# Patient Record
Sex: FEMALE | Race: WHITE | Employment: OTHER | URBAN - METROPOLITAN AREA
[De-identification: names, ages, dates, MRNs, and addresses within clinical notes are randomized per-mention and may not be internally consistent; named-entity substitution may affect disease eponyms.]

---

## 2017-01-01 ENCOUNTER — MEDICAL CORRESPONDENCE (OUTPATIENT)
Dept: HEALTH INFORMATION MANAGEMENT | Facility: CLINIC | Age: 82
End: 2017-01-01

## 2017-01-01 ENCOUNTER — NURSING HOME VISIT (OUTPATIENT)
Dept: GERIATRICS | Facility: CLINIC | Age: 82
End: 2017-01-01
Payer: COMMERCIAL

## 2017-01-01 ENCOUNTER — NURSING HOME VISIT (OUTPATIENT)
Dept: GERIATRICS | Facility: CLINIC | Age: 82
End: 2017-01-01
Payer: MEDICARE

## 2017-01-01 ENCOUNTER — TRANSFERRED RECORDS (OUTPATIENT)
Dept: HEALTH INFORMATION MANAGEMENT | Facility: CLINIC | Age: 82
End: 2017-01-01

## 2017-01-01 VITALS
HEART RATE: 82 BPM | OXYGEN SATURATION: 97 % | RESPIRATION RATE: 20 BRPM | TEMPERATURE: 98.8 F | SYSTOLIC BLOOD PRESSURE: 121 MMHG | WEIGHT: 109.5 LBS | DIASTOLIC BLOOD PRESSURE: 66 MMHG | BODY MASS INDEX: 22.1 KG/M2

## 2017-01-01 VITALS
TEMPERATURE: 98.8 F | DIASTOLIC BLOOD PRESSURE: 58 MMHG | WEIGHT: 116 LBS | BODY MASS INDEX: 23.42 KG/M2 | HEART RATE: 93 BPM | RESPIRATION RATE: 20 BRPM | OXYGEN SATURATION: 94 % | SYSTOLIC BLOOD PRESSURE: 117 MMHG

## 2017-01-01 VITALS
OXYGEN SATURATION: 98 % | TEMPERATURE: 98.7 F | WEIGHT: 109.8 LBS | HEART RATE: 86 BPM | RESPIRATION RATE: 16 BRPM | SYSTOLIC BLOOD PRESSURE: 105 MMHG | BODY MASS INDEX: 22.16 KG/M2 | DIASTOLIC BLOOD PRESSURE: 69 MMHG

## 2017-01-01 VITALS
HEART RATE: 82 BPM | RESPIRATION RATE: 18 BRPM | BODY MASS INDEX: 22.79 KG/M2 | OXYGEN SATURATION: 95 % | SYSTOLIC BLOOD PRESSURE: 126 MMHG | TEMPERATURE: 98 F | WEIGHT: 112.9 LBS | DIASTOLIC BLOOD PRESSURE: 72 MMHG

## 2017-01-01 VITALS
BODY MASS INDEX: 22.23 KG/M2 | DIASTOLIC BLOOD PRESSURE: 63 MMHG | OXYGEN SATURATION: 94 % | RESPIRATION RATE: 20 BRPM | WEIGHT: 110.1 LBS | TEMPERATURE: 97.6 F | HEART RATE: 66 BPM | SYSTOLIC BLOOD PRESSURE: 106 MMHG

## 2017-01-01 VITALS
OXYGEN SATURATION: 94 % | TEMPERATURE: 98.8 F | HEART RATE: 93 BPM | WEIGHT: 111 LBS | DIASTOLIC BLOOD PRESSURE: 58 MMHG | SYSTOLIC BLOOD PRESSURE: 117 MMHG | BODY MASS INDEX: 22.41 KG/M2 | RESPIRATION RATE: 20 BRPM

## 2017-01-01 VITALS
SYSTOLIC BLOOD PRESSURE: 132 MMHG | WEIGHT: 109.9 LBS | DIASTOLIC BLOOD PRESSURE: 60 MMHG | BODY MASS INDEX: 22.19 KG/M2 | RESPIRATION RATE: 20 BRPM | HEART RATE: 76 BPM | TEMPERATURE: 97 F | OXYGEN SATURATION: 96 %

## 2017-01-01 VITALS
DIASTOLIC BLOOD PRESSURE: 67 MMHG | BODY MASS INDEX: 22.21 KG/M2 | OXYGEN SATURATION: 98 % | SYSTOLIC BLOOD PRESSURE: 118 MMHG | TEMPERATURE: 88 F | WEIGHT: 110 LBS | HEART RATE: 70 BPM | RESPIRATION RATE: 16 BRPM

## 2017-01-01 DIAGNOSIS — F02.818 LATE ONSET ALZHEIMER'S DISEASE WITH BEHAVIORAL DISTURBANCE (H): Primary | ICD-10-CM

## 2017-01-01 DIAGNOSIS — R40.0 SLEEPINESS: ICD-10-CM

## 2017-01-01 DIAGNOSIS — E03.9 ACQUIRED HYPOTHYROIDISM: ICD-10-CM

## 2017-01-01 DIAGNOSIS — G30.1 LATE ONSET ALZHEIMER'S DISEASE WITH BEHAVIORAL DISTURBANCE (H): ICD-10-CM

## 2017-01-01 DIAGNOSIS — F41.9 ANXIETY DISORDER, UNSPECIFIED TYPE: ICD-10-CM

## 2017-01-01 DIAGNOSIS — E03.9 ACQUIRED HYPOTHYROIDISM: Primary | ICD-10-CM

## 2017-01-01 DIAGNOSIS — F32.9 MAJOR DEPRESSIVE DISORDER WITH SINGLE EPISODE, REMISSION STATUS UNSPECIFIED: ICD-10-CM

## 2017-01-01 DIAGNOSIS — G30.1 LATE ONSET ALZHEIMER'S DISEASE WITH BEHAVIORAL DISTURBANCE (H): Primary | ICD-10-CM

## 2017-01-01 DIAGNOSIS — F02.818 LATE ONSET ALZHEIMER'S DISEASE WITH BEHAVIORAL DISTURBANCE (H): ICD-10-CM

## 2017-01-01 DIAGNOSIS — R45.1 AGITATION: ICD-10-CM

## 2017-01-01 DIAGNOSIS — R46.89 AGGRESSIVE BEHAVIOR: Primary | ICD-10-CM

## 2017-01-01 DIAGNOSIS — F41.9 ANXIETY DISORDER, UNSPECIFIED TYPE: Primary | ICD-10-CM

## 2017-01-01 DIAGNOSIS — R62.7 ADULT FAILURE TO THRIVE: Primary | ICD-10-CM

## 2017-01-01 LAB
DIFFERENTIAL: ABNORMAL
ERYTHROCYTE [DISTWIDTH] IN BLOOD BY AUTOMATED COUNT: 14.7 % (ref 11–14.5)
HCT VFR BLD AUTO: 47.9 % (ref 35–47)
HEMOGLOBIN: 14.9 G/DL (ref 12–16)
MCH RBC QN AUTO: 28.7 PG (ref 27–34)
MCHC RBC AUTO-ENTMCNC: 31.1 G/DL (ref 32–36)
MCV RBC AUTO: 92 FL (ref 80–100)
PLATELET # BLD AUTO: 198 THOU/UL (ref 140–440)
RBC # BLD AUTO: 5.19 MILL/UL (ref 3.8–5.4)
WBC # BLD AUTO: 6.4 THOU/UL (ref 4–11)

## 2017-01-01 PROCEDURE — 99309 SBSQ NF CARE MODERATE MDM 30: CPT | Performed by: NURSE PRACTITIONER

## 2017-01-01 PROCEDURE — 99308 SBSQ NF CARE LOW MDM 20: CPT | Performed by: NURSE PRACTITIONER

## 2017-01-01 PROCEDURE — 99309 SBSQ NF CARE MODERATE MDM 30: CPT | Mod: GW | Performed by: INTERNAL MEDICINE

## 2017-01-01 PROCEDURE — 99318 ZZC ANNUAL NURSING FAC ASSESSMNT, STABLE: CPT | Performed by: NURSE PRACTITIONER

## 2017-01-01 PROCEDURE — 99207 ZZC CDG-CORRECTLY CODED, REVIEWED AND AGREE: CPT | Performed by: INTERNAL MEDICINE

## 2017-01-01 PROCEDURE — 99308 SBSQ NF CARE LOW MDM 20: CPT | Performed by: INTERNAL MEDICINE

## 2017-01-01 RX ORDER — DIVALPROEX SODIUM 125 MG/1
250 CAPSULE, COATED PELLETS ORAL 4 TIMES DAILY
COMMUNITY
End: 2018-01-01

## 2017-01-01 RX ORDER — DIVALPROEX SODIUM 250 MG/1
250 TABLET, EXTENDED RELEASE ORAL 4 TIMES DAILY
COMMUNITY
End: 2017-01-01 | Stop reason: DRUGHIGH

## 2017-01-01 RX ORDER — BISACODYL 10 MG
10 SUPPOSITORY, RECTAL RECTAL
COMMUNITY

## 2017-01-01 RX ORDER — LORAZEPAM 0.5 MG/1
0.5 TABLET ORAL PRN
COMMUNITY
End: 2018-01-01

## 2017-01-03 ENCOUNTER — NURSING HOME VISIT (OUTPATIENT)
Dept: GERIATRICS | Facility: CLINIC | Age: 82
End: 2017-01-03
Payer: MEDICARE

## 2017-01-03 VITALS
WEIGHT: 151.2 LBS | HEART RATE: 84 BPM | BODY MASS INDEX: 30.52 KG/M2 | RESPIRATION RATE: 16 BRPM | OXYGEN SATURATION: 92 % | TEMPERATURE: 98 F | DIASTOLIC BLOOD PRESSURE: 70 MMHG | SYSTOLIC BLOOD PRESSURE: 124 MMHG

## 2017-01-03 DIAGNOSIS — F41.9 ANXIETY DISORDER, UNSPECIFIED TYPE: ICD-10-CM

## 2017-01-03 DIAGNOSIS — G30.1 LATE ONSET ALZHEIMER'S DISEASE WITH BEHAVIORAL DISTURBANCE (H): ICD-10-CM

## 2017-01-03 DIAGNOSIS — E03.9 HYPOTHYROIDISM, UNSPECIFIED TYPE: Primary | ICD-10-CM

## 2017-01-03 DIAGNOSIS — F02.818 LATE ONSET ALZHEIMER'S DISEASE WITH BEHAVIORAL DISTURBANCE (H): ICD-10-CM

## 2017-01-03 DIAGNOSIS — F32.9 MAJOR DEPRESSIVE DISORDER WITH SINGLE EPISODE, REMISSION STATUS UNSPECIFIED: ICD-10-CM

## 2017-01-03 PROCEDURE — 99310 SBSQ NF CARE HIGH MDM 45: CPT | Performed by: NURSE PRACTITIONER

## 2017-01-03 PROCEDURE — 99207 ZZC CDG-UP CODE -MED NECESSITY: CPT | Performed by: NURSE PRACTITIONER

## 2017-01-03 NOTE — PROGRESS NOTES
[place] [facility]    Chief Complaint   Patient presents with     custodial Regulatory       HPI:    Carlita Irizarry is a 89 year old  (1/2/1924), who is being seen today for a federally mandated E/M visit at [facility].     Today's concerns are:  Hypothyroidism, unspecified type  TSH   Date Value Ref Range Status   07/01/2012 1.22 0.4 - 5.0 mU/L Final   Currently taking levothyroxine 75 mcg PO qday. Asymptomatic.     Late onset Alzheimer's disease with behavioral disturbance  BIMS 3/15 on 11/23/16. No behaviors noted. Last fall 1/1/17. Taking Remeron and seroquel.     Major depressive disorder with single episode, remission status unspecified/Anxiety disorder, unspecified type  PHQ-9 00/27 on 11/23/16. Mood pleasant.     ALLERGIES: Review of patient's allergies indicates no known allergies.  PAST MEDICAL HISTORY:  has a past medical history of Thyroid disease; Alzheimer disease; and High cholesterol.  PAST SURGICAL HISTORY:  has past surgical history that includes Irrigation and debridement upper extremity, combined (6/30/2012).  FAMILY HISTORY: family history is not on file.  SOCIAL HISTORY:  reports that she has quit smoking. She does not have any smokeless tobacco history on file. She reports that she drinks alcohol. She reports that she does not use illicit drugs.    MEDICATIONS:  Current Outpatient Prescriptions   Medication Sig Dispense Refill     Acetaminophen (TYLENOL ARTHRITIS PAIN PO) Take 650 mg by mouth 3 times daily Also give 650mg po qday prn       Dextromethorphan-guaiFENesin  MG/5ML syrup Take 10 mLs by mouth every 6 hours as needed for cough       loperamide (IMODIUM) 2 MG capsule Take 2 mg by mouth 4 times daily as needed for diarrhea       Acetaminophen (TYLENOL PO) Take 650 mg by mouth every 4 hours as needed for mild pain or fever       ASPIRIN PO Take 81 mg by mouth daily       bisacodyl (DULCOLAX) 10 MG suppository Place 10 mg rectally daily as needed for  constipation       calcium carb 1250 mg, 500 mg [place],/vitamin D 200 units (OSCAL WITH D) 500-200 MG-UNIT per tablet Take 1 tablet by mouth 2 times daily (with meals)       sodium phosphate (FLEET ENEMA) 7-19 GM/118ML rectal enema Place 1 enema rectally daily as needed for constipation       Mirtazapine (REMERON PO) Take 15 mg by mouth daily       SUNSCREENS EX Externally apply topically every 12 hours as needed Apply to skin topically every 12 hours as needed for sun exposure       magnesium hydroxide (MILK OF MAGNESIA) 400 MG/5ML suspension Take 30 mLs by mouth daily as needed for constipation or heartburn       alum & mag hydroxide-simethicone (MYLANTA/MAALOX) 200-200-20 MG/5ML SUSP Take 30 mLs by mouth 4 times daily as needed for indigestion       QUEtiapine Fumarate (SEROQUEL PO) Take as directed       LEVOTHYROXINE SODIUM PO Take 75 mcg by mouth daily        DONEPEZIL HCL PO Take 10 mg by mouth every evening.       Medications reconciled to facility chart and changes were made to reflect current medications as identified as above med list. Below are the changes that were made:   Medications stopped since last EPIC medication reconciliation:   Medications Discontinued During This Encounter   Medication Reason     oxyCODONE (ROXICODONE) 5 MG immediate release tablet Medication Reconciliation Clean Up     Case Management:  I have reviewed the care plan and MDS and do agree with the plan. Patient's desire to return to the community is not present.  Information reviewed:  Medications, vital signs, orders, and nursing notes.    ROS:  4 point ROS including Respiratory, CV, GI and , other than that noted in the HPI,  is negative    Exam:  /70 mmHg  Pulse 84  Temp(Src) 98  F (36.7  C)  Resp 16  Wt 151 lb 3.2 oz (68.584 kg)  SpO2 92%  GENERAL APPEARANCE:  Alert, in no distress  ENT:  Mouth and posterior oropharynx normal, moist mucous membranes  RESP:  respiratory effort and palpation of chest normal, lungs  clear to auscultation   CV:  Palpation and auscultation of heart done , regular rate and rhythm, no murmur, rub, or gallop  M/S:   active movement of bilateral upper and lower extremities.   SKIN:  Inspection of skin and subcutaneous tissue baseline, Palpation of skin and subcutaneous tissue baseline  NEURO:   Cranial nerves 2-12 are normal tested and grossly at patient's baseline  PSYCH:  memory impaired , affect and mood normal    Lab/Diagnostic data:      CBC RESULTS:   Recent Labs   Lab Test  04/19/15   1510  07/06/13   1326   WBC  6.5  6.3   RBC  4.56  4.70   HGB  13.2  14.6   HCT  40.3  43.8   MCV  88  93   MCH  28.9  31.1   MCHC  32.8  33.3   RDW  13.9  13.4   PLT  187  239       Last Basic Metabolic Panel:  Recent Labs   Lab Test  04/19/15   1510  07/06/13   1326   NA  140  140   POTASSIUM  3.7  3.8   CHLORIDE  107  104   OZ  8.0*  9.0   CO2  27  26   BUN  20  18   CR  0.82  0.83   GLC  110*  163*       Liver Function Studies -   Recent Labs   Lab Test  07/06/13   1326   PROTTOTAL  6.6*   ALBUMIN  3.9   BILITOTAL  0.6   ALKPHOS  68   AST  31   ALT  33       TSH   Date Value Ref Range Status   07/01/2012 1.22 0.4 - 5.0 mU/L Final         ASSESSMENT/PLAN  (E03.9) Hypothyroidism, unspecified type  (primary encounter diagnosis)  On replacement. TSH next lab day. Continue Synthroid as ordered.     (G30.1,  F02.81) Late onset Alzheimer's disease with behavioral disturbance  Chronic, progressive. Needs 24 hour skilled nursing care. HPI/ROS difficult to obtain with cognitive impairment. Expect further functional and cognitive decline. Expect weight loss. Continue 24 hour care. Monitor weight. Monitor functional status. Monitor for behavioral disturbances.    (F32.9) Major depressive disorder with single episode, remission status unspecifie  (F41.9) Anxiety disorder, unspecified type  Continue Remeron and scheduled and prn acetaminophen.     Orders:  1.) TSH, BMP, CBC, vitamin B12 next lab day    Total time spent  with patient visit was 35 min including patient visit and review of past records.Greater than 50% of total time spent with counseling and coordinating care.    Electronically signed by:  MICHAEL Porras CNP

## 2017-01-09 ENCOUNTER — TELEPHONE (OUTPATIENT)
Dept: GERIATRICS | Facility: CLINIC | Age: 82
End: 2017-01-09

## 2017-01-09 ENCOUNTER — TRANSFERRED RECORDS (OUTPATIENT)
Dept: HEALTH INFORMATION MANAGEMENT | Facility: CLINIC | Age: 82
End: 2017-01-09

## 2017-01-09 DIAGNOSIS — R11.2 NON-INTRACTABLE VOMITING WITH NAUSEA, UNSPECIFIED VOMITING TYPE: Primary | ICD-10-CM

## 2017-01-09 LAB
ANION GAP SERPL CALCULATED.3IONS-SCNC: 9 MMOL/L (ref 5–18)
BUN SERPL-MCNC: 24 MG/DL (ref 8–28)
CALCIUM SERPL-MCNC: 8.8 MG/DL (ref 8.5–10.5)
CHLORIDE SERPLBLD-SCNC: 106 MMOL/L (ref 98–107)
CO2 SERPL-SCNC: 26 MMOL/L (ref 22–31)
CREAT SERPL-MCNC: 0.74 MG/DL (ref 0.6–1.1)
ERYTHROCYTE [DISTWIDTH] IN BLOOD BY AUTOMATED COUNT: 14.1 % (ref 11–14.5)
GFR SERPL CREATININE-BSD FRML MDRD: >60 ML/MIN/1.73M2
GLUCOSE SERPL-MCNC: 95 MG/DL (ref 70–125)
HCT VFR BLD AUTO: 45.3 % (ref 35–47)
HEMOGLOBIN: 13.9 G/DL (ref 12–16)
MCH RBC QN AUTO: 28.3 PG (ref 27–34)
MCHC RBC AUTO-ENTMCNC: 30.7 G/DL (ref 32–36)
MCV RBC AUTO: 92 FL (ref 80–100)
PLATELET # BLD AUTO: 261 THOU/UL (ref 140–440)
PMV BLD: 10.3 FL (ref 8.5–12.5)
POTASSIUM SERPL-SCNC: 4.5 MMOL/L (ref 3.5–5)
RBC # BLD AUTO: 4.92 MILL/UL (ref 3.8–?)
SODIUM SERPL-SCNC: 141 MMOL/L (ref 136–145)
WBC # BLD AUTO: 5.6 THOU/UL (ref 4–11)

## 2017-01-09 RX ORDER — ACETAMINOPHEN 500 MG
1000 TABLET ORAL 3 TIMES DAILY
Qty: 100 TABLET | Refills: 0
Start: 2017-01-09 | End: 2017-04-25

## 2017-01-10 ENCOUNTER — TRANSFERRED RECORDS (OUTPATIENT)
Dept: HEALTH INFORMATION MANAGEMENT | Facility: CLINIC | Age: 82
End: 2017-01-10

## 2017-01-10 LAB
ANION GAP SERPL CALCULATED.3IONS-SCNC: 11 MMOL/L (ref 5–18)
BUN SERPL-MCNC: 23 MG/DL (ref 8–28)
CALCIUM SERPL-MCNC: 8.4 MG/DL (ref 8.5–10.5)
CHLORIDE SERPLBLD-SCNC: 107 MMOL/L (ref 98–107)
CO2 SERPL-SCNC: 21 MMOL/L (ref 22–31)
CREAT SERPL-MCNC: 0.67 MG/DL (ref 0.6–1.1)
DIFFERENTIAL: ABNORMAL
ERYTHROCYTE [DISTWIDTH] IN BLOOD BY AUTOMATED COUNT: 14.3 % (ref 11–14.5)
GFR SERPL CREATININE-BSD FRML MDRD: >60 ML/MIN/1.73M2
GLUCOSE SERPL-MCNC: 82 MG/DL (ref 70–125)
HCT VFR BLD AUTO: 44.1 % (ref 35–47)
HEMOGLOBIN: 13.6 G/DL (ref 12–16)
MCH RBC QN AUTO: 28.6 PG (ref 27–34)
MCHC RBC AUTO-ENTMCNC: 30.8 G/DL (ref 32–36)
MCV RBC AUTO: 93 FL (ref 80–100)
PLATELET # BLD AUTO: 219 THOU/UL (ref 140–440)
POTASSIUM SERPL-SCNC: 4 MMOL/L (ref 3.5–5)
RBC # BLD AUTO: 4.76 MILL/UL (ref 3.8–5.4)
SODIUM SERPL-SCNC: 139 MMOL/L (ref 136–145)
TSH SERPL-ACNC: 3.6 ULU/ML (ref 0.3–5)
WBC # BLD AUTO: 4.8 THOU/UL (ref 4–11)

## 2017-01-10 NOTE — TELEPHONE ENCOUNTER
Writer notified that resident has had 2 emesis over previous 48 hours and temperature ranging from 99.6-100.7. Has tylenol 650 mg PO qday. Denies abdominal pain and changes in bowels. No emesis on day shift today; however, resident having poor PO intake.     Plan:  CBC and BMP: within normal limits 1/9/17  Tylenol 1000 mg PO TID  Encourage po intake and notify NP with changes.

## 2017-01-13 ENCOUNTER — NURSING HOME VISIT (OUTPATIENT)
Dept: GERIATRICS | Facility: CLINIC | Age: 82
End: 2017-01-13
Payer: MEDICARE

## 2017-01-13 VITALS
OXYGEN SATURATION: 93 % | DIASTOLIC BLOOD PRESSURE: 62 MMHG | WEIGHT: 110.9 LBS | RESPIRATION RATE: 16 BRPM | BODY MASS INDEX: 22.39 KG/M2 | HEART RATE: 58 BPM | SYSTOLIC BLOOD PRESSURE: 121 MMHG | TEMPERATURE: 99.3 F

## 2017-01-13 DIAGNOSIS — G30.1 LATE ONSET ALZHEIMER'S DISEASE WITH BEHAVIORAL DISTURBANCE (H): Primary | ICD-10-CM

## 2017-01-13 DIAGNOSIS — F02.818 LATE ONSET ALZHEIMER'S DISEASE WITH BEHAVIORAL DISTURBANCE (H): Primary | ICD-10-CM

## 2017-01-13 PROCEDURE — 99207 ZZC CDG-CORRECTLY CODED, REVIEWED AND AGREE: CPT | Performed by: NURSE PRACTITIONER

## 2017-01-13 PROCEDURE — 99308 SBSQ NF CARE LOW MDM 20: CPT | Performed by: NURSE PRACTITIONER

## 2017-01-13 NOTE — PROGRESS NOTES
[place] [facility]    Chief Complaint   Patient presents with     RECHECK     Behaviors       HPI:    Carlita Irizarry is a 89 year old  (1/2/1924), who is being seen today for an episodic care visit at [facility].       Today's concern is:  Late onset Alzheimer's disease with behavioral disturbance  Last BIMS 3/15 on 11/23/16. No behaviors noted by staff.   Taking Seroquel :  -0800 100mg  -1200 25 mg  -1600 50 mg  -2000 50 mg      REVIEW OF SYSTEMS:  Unobtainable secondary to cognitive impairment or aphasia.    /62 mmHg  Pulse 58  Temp(Src) 99.3  F (37.4  C)  Resp 16  Wt 110 lb 14.4 oz (50.304 kg)  SpO2 93%      GENERAL APPEARANCE:  Alert, in no distress  Psych: Memory impairment.    Assessment:  (G30.1,  F02.81) Late onset Alzheimer's disease with behavioral disturbance  (primary encounter diagnosis)  No behaviors noted on current doses of Seroquel Remeron    Plan:  1.) Decrease morning dose of Seroquel to 75 mg PO   2.) Resides on secure dementia unit with assistance for ADLs and meals.  3.) Continue to monitor behavior and mood and make adjustments as clinically indicated.     Time 15 minutes    MICHAEL Porras CNP

## 2017-01-15 RX ORDER — QUETIAPINE FUMARATE 25 MG/1
75 TABLET, FILM COATED ORAL EVERY MORNING
Qty: 60 TABLET
Start: 2017-01-15 | End: 2017-05-24

## 2017-01-15 RX ORDER — QUETIAPINE FUMARATE 25 MG/1
25 TABLET, FILM COATED ORAL DAILY
Start: 2017-01-15 | End: 2017-05-24 | Stop reason: DRUGHIGH

## 2017-03-15 VITALS
WEIGHT: 108.2 LBS | RESPIRATION RATE: 18 BRPM | OXYGEN SATURATION: 93 % | TEMPERATURE: 96.8 F | DIASTOLIC BLOOD PRESSURE: 70 MMHG | BODY MASS INDEX: 21.84 KG/M2 | HEART RATE: 92 BPM | SYSTOLIC BLOOD PRESSURE: 110 MMHG

## 2017-03-16 ENCOUNTER — NURSING HOME VISIT (OUTPATIENT)
Dept: GERIATRICS | Facility: CLINIC | Age: 82
End: 2017-03-16
Payer: COMMERCIAL

## 2017-03-16 DIAGNOSIS — F02.818 LATE ONSET ALZHEIMER'S DISEASE WITH BEHAVIORAL DISTURBANCE (H): ICD-10-CM

## 2017-03-16 DIAGNOSIS — F41.9 ANXIETY DISORDER, UNSPECIFIED TYPE: ICD-10-CM

## 2017-03-16 DIAGNOSIS — F32.9 MAJOR DEPRESSIVE DISORDER WITH SINGLE EPISODE, REMISSION STATUS UNSPECIFIED: ICD-10-CM

## 2017-03-16 DIAGNOSIS — E03.9 ACQUIRED HYPOTHYROIDISM: Primary | ICD-10-CM

## 2017-03-16 DIAGNOSIS — G30.1 LATE ONSET ALZHEIMER'S DISEASE WITH BEHAVIORAL DISTURBANCE (H): ICD-10-CM

## 2017-03-16 PROCEDURE — 99308 SBSQ NF CARE LOW MDM 20: CPT | Performed by: INTERNAL MEDICINE

## 2017-03-16 PROCEDURE — 99207 ZZC CDG-CORRECTLY CODED, REVIEWED AND AGREE: CPT | Performed by: INTERNAL MEDICINE

## 2017-03-23 PROBLEM — E03.9 ACQUIRED HYPOTHYROIDISM: Status: ACTIVE | Noted: 2017-03-23

## 2017-03-23 NOTE — PROGRESS NOTES
Carlita Irizarry is a 89 year old female seen March 15, 2017 at [facility] where she has resided for 7 months (admit 8/2016) seen to follow up dementia with behavioral disturbance and frequent falls.   She is seen on the unit, up to WC.  Quiet and a little suspicious, limited history.           By chart review she has progressive dementia, on donepezil for the past 5 years.   She has had recurrent falls, and was seen in ER for confusion, disorientation and hallucinations 3 years ago  She has required quetiapine for psychotic features, very gradual dose reductions going okay so far.    .    She is also treated for hypothyroidism and anxiety.      PMH:  Alzheimer's dementia, dx 2011  Anxiety / depression  Hypothyroidism  S/p open left elbow fx, 2012    SH:  , previously lived in a Senior Apartment.   Has one son Fredis, who is POA.     ROS:  Limited, but negative except for above.    Weight is stable.      EXAM:  NAD  /70  Pulse 92  Temp 96.8  F (36  C)  Resp 18  Wt 108 lb 3.2 oz (49.1 kg)  SpO2 93%  BMI 21.84 kg/m2   Neck supple without adenopathy  Lungs clear bilaterally with fair air movement  Heart RRR s1s2, without murmur  Abd soft, NT, no distention, +BS  Ext without edema  Neuro: no focal findings, no tremor or stiffness, limited history  Psych: affect okay, suspicious    TSH   Date Value Ref Range Status   01/10/2017 3.60 0.30 - 5.00 ulU/mL Final     Lab Results   Component Value Date    WBC 4.8 01/10/2017     Lab Results   Component Value Date    RBC 4.76 01/10/2017     Lab Results   Component Value Date    HGB 13.6 01/10/2017     Lab Results   Component Value Date    MCV 93 01/10/2017     Lab Results   Component Value Date     01/10/2017      Last Basic Metabolic Panel:  Lab Results   Component Value Date     01/10/2017      Lab Results   Component Value Date    POTASSIUM 4.0 01/10/2017     Lab Results   Component Value Date    CHLORIDE 107 01/10/2017     Lab  Results   Component Value Date    OZ 8.4 01/10/2017     Lab Results   Component Value Date    CO2 21 01/10/2017     Lab Results   Component Value Date    BUN 23 01/10/2017     Lab Results   Component Value Date    CR 0.67 01/10/2017     Lab Results   Component Value Date    GLC 82 01/10/2017         IMP/PLAN:  (E03.9) Hypothyroidism, unspecified type    Comment: on replacement, at goal TSH  Plan: yearly recheck    (G30.1,  F02.81) Late onset Alzheimer's disease with behavioral disturbance  Comment: with suspiciousness, loss of language, orientation  Plan: Board and Care Memory Care unit for assist with meds, meals, activity, safety.   Continue GDR of quetiapine as tolerated.       (F41.9) Anxiety disorder, unspecified type  Comment: and depression  Plan: continue Angela Fitch MD

## 2017-04-25 ENCOUNTER — NURSING HOME VISIT (OUTPATIENT)
Dept: GERIATRICS | Facility: CLINIC | Age: 82
End: 2017-04-25
Payer: COMMERCIAL

## 2017-04-25 VITALS
HEART RATE: 84 BPM | SYSTOLIC BLOOD PRESSURE: 132 MMHG | RESPIRATION RATE: 20 BRPM | WEIGHT: 111.7 LBS | TEMPERATURE: 99.1 F | DIASTOLIC BLOOD PRESSURE: 76 MMHG | OXYGEN SATURATION: 94 % | BODY MASS INDEX: 22.55 KG/M2

## 2017-04-25 DIAGNOSIS — F32.9 MAJOR DEPRESSIVE DISORDER WITH SINGLE EPISODE, REMISSION STATUS UNSPECIFIED: ICD-10-CM

## 2017-04-25 DIAGNOSIS — F41.9 ANXIETY DISORDER, UNSPECIFIED TYPE: ICD-10-CM

## 2017-04-25 DIAGNOSIS — F02.818 LATE ONSET ALZHEIMER'S DISEASE WITH BEHAVIORAL DISTURBANCE (H): Primary | ICD-10-CM

## 2017-04-25 DIAGNOSIS — E03.9 ACQUIRED HYPOTHYROIDISM: ICD-10-CM

## 2017-04-25 DIAGNOSIS — G30.1 LATE ONSET ALZHEIMER'S DISEASE WITH BEHAVIORAL DISTURBANCE (H): Primary | ICD-10-CM

## 2017-04-25 PROCEDURE — 99309 SBSQ NF CARE MODERATE MDM 30: CPT | Performed by: NURSE PRACTITIONER

## 2017-04-25 PROCEDURE — 99207 ZZC CDG-CORRECTLY CODED, REVIEWED AND AGREE: CPT | Performed by: NURSE PRACTITIONER

## 2017-04-25 RX ORDER — BACITRACIN ZINC 500 [USP'U]/G
OINTMENT TOPICAL
COMMUNITY
Start: 2017-04-24 | End: 2017-04-29

## 2017-04-25 NOTE — PROGRESS NOTES
[place] [facility]    Chief Complaint   Patient presents with     RECHECK       HPI:    Carlita Irizarry is a 89 year old  (1/2/1924), who is being seen today for an episodic care visit at [facility].     Today's concern is:  Late onset Alzheimer's disease with behavioral disturbance/Major depressive disorder with single episode, remission status unspecified/Anxiety disorder, unspecified type  Patient very pleasant today with minimal noted behaviors since transfer to unit. She is seen today sitting in dining room at a table conversating with another resident. The patient is very thankful for my visit and for caring for her while she resides in this unit.  She is on a regimen of Seroquel 75mg qAM, 50mg at noon, 1600 and HS; She is also on Donpezil and Remeron. Patient's son received a non-coverage letter for Seroquel on 4/5 - patient to receive a 93-day supply then medication will not be covered.     Acquired hypothyroidism  Lab Results   Component Value Date    TSH 3.60 01/10/2017   Patient on regimen of Synthroid 75mcg qDay       ALLERGIES: Review of patient's allergies indicates no known allergies.  Past Medical, Surgical, Family and Social History reviewed and updated in [facility].    Current Outpatient Prescriptions   Medication Sig Dispense Refill     bacitracin ointment two times a day for scratch on face for 5 Days       QUEtiapine (SEROQUEL) 25 MG tablet Take 3 tablets (75 mg) by mouth every morning Take as directed 60 tablet      QUEtiapine (SEROQUEL) 25 MG tablet Take 1 tablet (25 mg) by mouth daily Take at noon.       QUEtiapine Fumarate (SEROQUEL PO) Take 50 mg by mouth daily 1600       QUEtiapine Fumarate (SEROQUEL PO) Take 50 mg by mouth At Bedtime       ASPIRIN PO Take 81 mg by mouth daily       calcium carb 1250 mg, 500 mg [place],/vitamin D 200 units (OSCAL WITH D) 500-200 MG-UNIT per tablet Take 1 tablet by mouth 2 times daily (with meals)       Mirtazapine (REMERON PO) Take 15  mg by mouth daily       LEVOTHYROXINE SODIUM PO Take 75 mcg by mouth daily        DONEPEZIL HCL PO Take 10 mg by mouth every evening.       Medications reconciled to facility chart and changes were made to reflect current medications as identified as above med list. Below are the changes that were made:   Medications stopped since last EPIC medication reconciliation:   Medications Discontinued During This Encounter   Medication Reason     Acetaminophen (TYLENOL PO) Medication Reconciliation Clean Up     acetaminophen (TYLENOL) 500 MG tablet Medication Reconciliation Clean Up       Medications started since last [facility] medication reconciliation:  Orders Placed This Encounter   Medications     bacitracin ointment     Sig: two times a day for scratch on face for 5 Days     REVIEW OF SYSTEMS:  4 point ROS including Respiratory, CV, GI and , other than that noted in the HPI,  is negative    Physical Exam:  /76  Pulse 84  Temp 99.1  F (37.3  C)  Resp 20  Wt 111 lb 11.2 oz (50.7 kg)  SpO2 94%  BMI 22.55 kg/m2  GENERAL APPEARANCE:  Alert, in no distress, appears healthy, cooperative, disoriented, pleasant, elderly female sitting at table in dining room  ENT:  Mouth and posterior oropharynx normal, moist mucous membranes, normal hearing acuity  EYES:  EOM, conjunctivae, lids, pupils and irises normal  NECK:  No adenopathy,masses or thyromegaly  RESP:  respiratory effort and palpation of chest normal, lungs clear to auscultation , no respiratory distress  CV:  Palpation and auscultation of heart done , regular rate and rhythm, no murmur, rub, or gallop, no edema, +2 pedal pulses  ABDOMEN:  normal bowel sounds, soft, nontender, no hepatosplenomegaly or other masses  M/S:   Gait and station abnormal - EVELIO 2/2 patient sitting in chair  Digits and nails abnormal - arthritic changes present  PSYCH:  oriented to self only, insight and judgement impaired, memory impaired , affect and mood normal, thought content -  disorganized    Recent Labs:      CBC RESULTS:   Recent Labs   Lab Test 01/10/17 01/09/17   WBC  4.8  5.6   RBC  4.76  4.92   HGB  13.6  13.9   HCT  44.1  45.3   MCV  93  92   MCH  28.6  28.3   MCHC  30.8*  30.7*   RDW  14.3  14.1   PLT  219  261       Last Basic Metabolic Panel:  Recent Labs   Lab Test 01/10/17 01/09/17   NA  139  141   POTASSIUM  4.0  4.5   CHLORIDE  107  106   OZ  8.4*  8.8   CO2  21*  26   BUN  23  24   CR  0.67  0.74   GLC  82  95       Liver Function Studies -   Recent Labs   Lab Test  07/06/13   1326   PROTTOTAL  6.6*   ALBUMIN  3.9   BILITOTAL  0.6   ALKPHOS  68   AST  31   ALT  33       TSH   Date Value Ref Range Status   01/10/2017 3.60 0.30 - 5.00 ulU/mL Final   07/01/2012 1.22 0.4 - 5.0 mU/L Final               Assessment/Plan:  Late onset Alzheimer's disease with behavioral disturbance/Major depressive disorder with single episode, remission status unspecified/Anxiety disorder, unspecified type  Will continue medications as currently ordered and allow patient to settle in to new unit - f/u in 2 weeks for further assessment of any noted behaviors and plan to taper down Seroquel to least necessary dosing for potential coverage by insurance    Acquired hypothyroidism  Stable on current regimen; continue medications as currently ordered.    Electronically signed by  DEVON Huang  [facility]

## 2017-05-08 NOTE — PROGRESS NOTES
[place] [facility]    Chief Complaint   Patient presents with     halfway Regulatory       HPI:    Carlita Irizarry is a 89 year old  (1/2/1924), who is being seen today for a federally mandated E/M visit at [facility].     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         ALLERGIES: Review of patient's allergies indicates no known allergies.  PAST MEDICAL HISTORY:  has a past medical history of Alzheimer disease; High cholesterol; and Thyroid disease.  PAST SURGICAL HISTORY:  has a past surgical history that includes Irrigation and debridement upper extremity, combined (6/30/2012).  FAMILY HISTORY: family history is not on file.  SOCIAL HISTORY:  reports that she has quit smoking. She does not have any smokeless tobacco history on file. She reports that she drinks alcohol. She reports that she does not use illicit drugs.    MEDICATIONS:  Current Outpatient Prescriptions   Medication Sig Dispense Refill     QUEtiapine (SEROQUEL) 25 MG tablet Take 3 tablets (75 mg) by mouth every morning Take as directed 60 tablet      QUEtiapine (SEROQUEL) 25 MG tablet Take 1  tablet (25 mg) by mouth daily Take at noon.       QUEtiapine Fumarate (SEROQUEL PO) Take 50 mg by mouth daily 1600       QUEtiapine Fumarate (SEROQUEL PO) Take 50 mg by mouth At Bedtime       ASPIRIN PO Take 81 mg by mouth daily       calcium carb 1250 mg, 500 mg [place],/vitamin D 200 units (OSCAL WITH D) 500-200 MG-UNIT per tablet Take 1 tablet by mouth 2 times daily (with meals)       Mirtazapine (REMERON PO) Take 15 mg by mouth daily       LEVOTHYROXINE SODIUM PO Take 75 mcg by mouth daily        DONEPEZIL HCL PO Take 10 mg by mouth every evening.         Case Management:  I have reviewed the care plan and MDS and do agree with the plan. Patient's desire to return to the community is not assessible due to cognitive impairment.  Information reviewed:  Medications, vital signs, orders, and nursing notes.    ROS:  4 point ROS including Respiratory, CV, GI and , other than that noted in the HPI,  is negative    Exam:  /76  Pulse 84  Temp 99.1  F (37.3  C)  Resp 20  Wt 111 lb 11.2 oz (50.7 kg)  SpO2 94%  BMI 22.55 kg/m2  GENERAL APPEARANCE:  Alert, in no distress, appears healthy, cooperative, disoriented, pleasant, elderly female sitting at table in dining room  ENT:  Mouth and posterior oropharynx normal, moist mucous membranes, normal hearing acuity  EYES:  EOM, conjunctivae, lids, pupils and irises normal  NECK:  No adenopathy,masses or thyromegaly  RESP: Respiratory effort and palpation of chest normal, lungs clear to auscultation , no respiratory distress  CV:  Palpation and auscultation of heart done , regular rate and rhythm, no murmur, rub, or gallop, no edema, +2 pedal pulses  ABDOMEN:  normal bowel sounds, soft, nontender, no hepatosplenomegaly or other masses  M/S:   Gait and station abnormal - w/c main means of mobility; Digits and nails abnormal - arthritic changes present  PSYCH:  oriented to self only, insight and judgement impaired, memory impaired, affect and mood normal, thought content  - disorganized    Lab/Diagnostic data:      CBC RESULTS:   Recent Labs   Lab Test 01/10/17 01/09/17   WBC  4.8  5.6   RBC  4.76  4.92   HGB  13.6  13.9   HCT  44.1  45.3   MCV  93  92   MCH  28.6  28.3   MCHC  30.8*  30.7*   RDW  14.3  14.1   PLT  219  261       Last Basic Metabolic Panel:  Recent Labs   Lab Test 01/10/17 01/09/17   NA  139  141   POTASSIUM  4.0  4.5   CHLORIDE  107  106   OZ  8.4*  8.8   CO2  21*  26   BUN  23  24   CR  0.67  0.74   GLC  82  95       Liver Function Studies -   Recent Labs   Lab Test  07/06/13   1326   PROTTOTAL  6.6*   ALBUMIN  3.9   BILITOTAL  0.6   ALKPHOS  68   AST  31   ALT  33       TSH   Date Value Ref Range Status   01/10/2017 3.60 0.30 - 5.00 ulU/mL Final   07/01/2012 1.22 0.4 - 5.0 mU/L Final             ASSESSMENT/PLAN  Major depressive disorder with single episode, remission status unspecified  Stable on current regimen; continue medications as currently ordered.    Late onset Alzheimer's disease with behavioral disturbance  Stable on current regimen; continue medications as currently ordered.    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    Total time spent with patient visit was 35 min including patient visit and review of past records.Greater than 50% of total time spent with counseling and coordinating care.    Electronically signed by:  DEVON Huang  [facility]

## 2017-05-09 ENCOUNTER — NURSING HOME VISIT (OUTPATIENT)
Dept: GERIATRICS | Facility: CLINIC | Age: 82
End: 2017-05-09
Payer: COMMERCIAL

## 2017-05-09 VITALS
RESPIRATION RATE: 20 BRPM | TEMPERATURE: 99.1 F | WEIGHT: 111.7 LBS | OXYGEN SATURATION: 94 % | DIASTOLIC BLOOD PRESSURE: 76 MMHG | SYSTOLIC BLOOD PRESSURE: 132 MMHG | HEART RATE: 84 BPM | BODY MASS INDEX: 22.55 KG/M2

## 2017-05-09 DIAGNOSIS — F41.9 ANXIETY DISORDER, UNSPECIFIED TYPE: ICD-10-CM

## 2017-05-09 DIAGNOSIS — F32.9 MAJOR DEPRESSIVE DISORDER WITH SINGLE EPISODE, REMISSION STATUS UNSPECIFIED: Primary | ICD-10-CM

## 2017-05-09 DIAGNOSIS — E03.9 ACQUIRED HYPOTHYROIDISM: ICD-10-CM

## 2017-05-09 DIAGNOSIS — F29 UNSPECIFIED PSYCHOSIS NOT DUE TO A SUBSTANCE OR KNOWN PHYSIOLOGICAL CONDITION (H): ICD-10-CM

## 2017-05-09 DIAGNOSIS — F02.818 LATE ONSET ALZHEIMER'S DISEASE WITH BEHAVIORAL DISTURBANCE (H): ICD-10-CM

## 2017-05-09 DIAGNOSIS — G30.1 LATE ONSET ALZHEIMER'S DISEASE WITH BEHAVIORAL DISTURBANCE (H): ICD-10-CM

## 2017-05-09 PROCEDURE — 99310 SBSQ NF CARE HIGH MDM 45: CPT | Performed by: NURSE PRACTITIONER

## 2017-05-09 PROCEDURE — 99207 ZZC CDG-CORRECTLY CODED, REVIEWED AND AGREE: CPT | Performed by: NURSE PRACTITIONER

## 2017-05-11 ENCOUNTER — TRANSFERRED RECORDS (OUTPATIENT)
Dept: HEALTH INFORMATION MANAGEMENT | Facility: CLINIC | Age: 82
End: 2017-05-11

## 2017-05-11 LAB
ALT SERPL-CCNC: 14 U/L (ref 0–45)
AST SERPL-CCNC: 14 U/L (ref 0–40)

## 2017-05-17 ENCOUNTER — NURSING HOME VISIT (OUTPATIENT)
Dept: GERIATRICS | Facility: CLINIC | Age: 82
End: 2017-05-17
Payer: COMMERCIAL

## 2017-05-17 DIAGNOSIS — G30.1 LATE ONSET ALZHEIMER'S DISEASE WITH BEHAVIORAL DISTURBANCE (H): Primary | ICD-10-CM

## 2017-05-17 DIAGNOSIS — R63.0 DECREASED APPETITE: ICD-10-CM

## 2017-05-17 DIAGNOSIS — F02.818 LATE ONSET ALZHEIMER'S DISEASE WITH BEHAVIORAL DISTURBANCE (H): Primary | ICD-10-CM

## 2017-05-17 PROCEDURE — 99308 SBSQ NF CARE LOW MDM 20: CPT | Performed by: NURSE PRACTITIONER

## 2017-05-17 PROCEDURE — 99207 ZZC CDG-CORRECTLY CODED, REVIEWED AND AGREE: CPT | Performed by: NURSE PRACTITIONER

## 2017-05-17 NOTE — PROGRESS NOTES
[place] [facility]    Chief Complaint   Patient presents with     RECHECK     HPI:    Carlita Irizarry is a 89 year old  (1/2/1924), who is being seen today for an episodic care visit at [facility]. Today's concern is:  Late onset Alzheimer's disease with behavioral disturbance/Decreased appetite  Prior visit with patient in attempt to decrease patient's Seroquel 2/2 non-coverage per her pharmacy; since decrease patient has had an increased inability to focus at meal times thus   Decreasing her PO intake. Seroquel decreased from regimen of 75mg (0800), 25mg (1200), 50mg (1600 & 2000) to 50mg (0800), 25mg (1600 & 2000) to a regimen of 50mg (0800) and 25mg (1600 & 2000).    REVIEW OF SYSTEMS:  4 point ROS including Respiratory, CV, GI and , other than that noted in the HPI,  is negative    GENERAL APPEARANCE:  Alert, in no distress  ABDOMEN:  normal bowel sounds, soft, nontender, no hepatosplenomegaly or other masses  PSYCH:  oriented to self only, insight and judgement impaired, memory impaired, affect and mood normal, thought content - disorganized    Assessment:  Late onset Alzheimer's disease with behavioral disturbance/Decreased appetite  Will add dose of 25mg of Seroquel at 1200 to see if there is an improvement in focus leading to her eating more at meal times; will f/u in 2 weeks or beforehand as requested    MICHAEL Huang CNP

## 2017-05-24 ENCOUNTER — NURSING HOME VISIT (OUTPATIENT)
Dept: GERIATRICS | Facility: CLINIC | Age: 82
End: 2017-05-24
Payer: COMMERCIAL

## 2017-05-24 VITALS
DIASTOLIC BLOOD PRESSURE: 76 MMHG | HEART RATE: 84 BPM | SYSTOLIC BLOOD PRESSURE: 132 MMHG | TEMPERATURE: 99.1 F | RESPIRATION RATE: 20 BRPM | WEIGHT: 110.1 LBS | BODY MASS INDEX: 22.23 KG/M2 | OXYGEN SATURATION: 94 %

## 2017-05-24 DIAGNOSIS — R11.2 NAUSEA AND VOMITING, INTRACTABILITY OF VOMITING NOT SPECIFIED, UNSPECIFIED VOMITING TYPE: Primary | ICD-10-CM

## 2017-05-24 PROCEDURE — 99308 SBSQ NF CARE LOW MDM 20: CPT | Performed by: NURSE PRACTITIONER

## 2017-05-24 NOTE — PROGRESS NOTES
"[place] [facility]    Chief Complaint   Patient presents with     Nausea     Vomiting       HPI:    Carlita Irizarry is a 89 year old  (1/2/1924), who is being seen today for an episodic care visit at [facility].       Today's concern is:  Nausea and vomiting, intractability of vomiting not specified, unspecified vomiting type  Staff report patient with 3 separate episodes on n/v  Patient unaware of these episodes and states that she feels \"just fine\" - she would like to know when she had these episodes    REVIEW OF SYSTEMS:  4 point ROS including Respiratory, CV, GI and , other than that noted in the HPI,  is negative    /76  Pulse 84  Temp 99.1  F (37.3  C)  Resp 20  Wt 110 lb 1.6 oz (49.9 kg)  SpO2 94%  BMI 22.23 kg/m2      GENERAL APPEARANCE:  Alert, in no distress  Abdomen: negative, Abdomen soft, non-tender. BS normal. No masses, organomegaly      Assessment:  Nausea and vomiting, intractability of vomiting not specified, unspecified vomiting type  CBC  BMP  Monitor    GERMAN HuangP  [facility]  "

## 2017-05-25 ENCOUNTER — TRANSFERRED RECORDS (OUTPATIENT)
Dept: HEALTH INFORMATION MANAGEMENT | Facility: CLINIC | Age: 82
End: 2017-05-25

## 2017-05-25 LAB
CREAT SERPL-MCNC: 0.71 MG/DL (ref 0.6–1.1)
GFR SERPL CREATININE-BSD FRML MDRD: >60 ML/MIN/1.73M2
GLUCOSE SERPL-MCNC: 85 MG/DL (ref 70–125)
POTASSIUM SERPL-SCNC: 4.2 MMOL/L (ref 3.5–5)

## 2017-05-30 ENCOUNTER — NURSING HOME VISIT (OUTPATIENT)
Dept: GERIATRICS | Facility: CLINIC | Age: 82
End: 2017-05-30
Payer: COMMERCIAL

## 2017-05-30 VITALS
DIASTOLIC BLOOD PRESSURE: 76 MMHG | SYSTOLIC BLOOD PRESSURE: 132 MMHG | OXYGEN SATURATION: 94 % | BODY MASS INDEX: 21.92 KG/M2 | RESPIRATION RATE: 20 BRPM | TEMPERATURE: 99.1 F | WEIGHT: 108.6 LBS | HEART RATE: 84 BPM

## 2017-05-30 DIAGNOSIS — G30.1 LATE ONSET ALZHEIMER'S DISEASE WITH BEHAVIORAL DISTURBANCE (H): ICD-10-CM

## 2017-05-30 DIAGNOSIS — F02.818 LATE ONSET ALZHEIMER'S DISEASE WITH BEHAVIORAL DISTURBANCE (H): ICD-10-CM

## 2017-05-30 DIAGNOSIS — R63.0 DECREASED APPETITE: Primary | ICD-10-CM

## 2017-05-30 PROCEDURE — 99207 ZZC CDG-CORRECTLY CODED, REVIEWED AND AGREE: CPT | Performed by: NURSE PRACTITIONER

## 2017-05-30 PROCEDURE — 99308 SBSQ NF CARE LOW MDM 20: CPT | Performed by: NURSE PRACTITIONER

## 2017-05-30 NOTE — PROGRESS NOTES
[facility]    Chief Complaint   Patient presents with     RECHECK       HPI:    Carlita Irizarry is a 89 year old  (1/2/1924), who is being seen today for an episodic care visit at [facility].     Today's concern is:  Late onset Alzheimer's disease with behavioral disturbance/Decreased appetite  F/u re:patient's episodes of n/v and decrease in appetite/loss of focus at meal times - restarted noon dose of Seroquel. Per staff patient stable, does continue to sundown mid-afternoon into the evening and becomes riled up - unit trying to avoid the use of PRN antipsychotics.   Patient with no further episodes of n/v.     ALLERGIES: Review of patient's allergies indicates no known allergies.  Past Medical, Surgical, Family and Social History reviewed and updated in [facility].    Current Outpatient Prescriptions   Medication Sig Dispense Refill     QUEtiapine Fumarate (SEROQUEL PO) Take 25 mg by mouth 3 times daily       ASPIRIN PO Take 81 mg by mouth daily       calcium carb 1250 mg, 500 mg [place],/vitamin D 200 units (OSCAL WITH D) 500-200 MG-UNIT per tablet Take 1 tablet by mouth 2 times daily (with meals)       Mirtazapine (REMERON PO) Take 15 mg by mouth daily       LEVOTHYROXINE SODIUM PO Take 75 mcg by mouth daily        DONEPEZIL HCL PO Take 10 mg by mouth every evening.       [DISCONTINUED] QUEtiapine Fumarate (SEROQUEL PO) Take 50 mg by mouth daily 1600       Medications reconciled to facility chart and changes were made to reflect current medications as identified as above med list. Below are the changes that were made:   Medications stopped since last EPIC medication reconciliation:   Medications Discontinued During This Encounter   Medication Reason     QUEtiapine Fumarate (SEROQUEL PO) Medication Reconciliation Clean Up       Medications started since last [facility] medication reconciliation:  No orders of the defined types were placed in this encounter.    REVIEW OF SYSTEMS:  4 point ROS  including Respiratory, CV, GI and , other than that noted in the HPI,  is negative    Physical Exam:  /76  Pulse 84  Temp 99.1  F (37.3  C)  Resp 20  Wt 108 lb 9.6 oz (49.3 kg)  SpO2 94%  BMI 21.92 kg/m2  Abdomen: negative, Abdomen soft, non-tender. BS normal. No masses, organomegaly    Recent Labs:      CBC RESULTS:   Recent Labs   Lab Test 01/10/17 01/09/17   WBC  4.8  5.6   RBC  4.76  4.92   HGB  13.6  13.9   HCT  44.1  45.3   MCV  93  92   MCH  28.6  28.3   MCHC  30.8*  30.7*   RDW  14.3  14.1   PLT  219  261       Last Basic Metabolic Panel:  Recent Labs   Lab Test 01/10/17 01/09/17   NA  139  141   POTASSIUM  4.0  4.5   CHLORIDE  107  106   OZ  8.4*  8.8   CO2  21*  26   BUN  23  24   CR  0.67  0.74   GLC  82  95       Liver Function Studies -   Recent Labs   Lab Test  07/06/13   1326   PROTTOTAL  6.6*   ALBUMIN  3.9   BILITOTAL  0.6   ALKPHOS  68   AST  31   ALT  33       TSH   Date Value Ref Range Status   01/10/2017 3.60 0.30 - 5.00 ulU/mL Final   07/01/2012 1.22 0.4 - 5.0 mU/L Final           Assessment/Plan:  Late onset Alzheimer's disease with behavioral disturbance/Decreased appetite  No new orders  Continue with behavioral interventions and distraction to avoid the use of unnecessary medications    Electronically signed by  DEVON Huang  [facility]

## 2017-07-18 NOTE — PROGRESS NOTES
[facility]    Chief Complaint   Patient presents with     RECHECK       HPI:    Carlita Irizarry is a 89 year old  (1/2/1924), who is being seen today for an episodic care visit at [facility].     Today's concern is:  Late onset Alzheimer's disease with behavioral disturbance  Patient continues with ongoing behaviors - these have increased with the decrease in Seroquel. Patient has to sit away from other residents at lunch time 2/2 getting into altercations with other table mates.       REVIEW OF SYSTEMS:  4 point ROS including Respiratory, CV, GI and , other than that noted in the HPI,  is negative    /60  Pulse 76  Temp 97  F (36.1  C)  Resp 20  Wt 109 lb 14.4 oz (49.9 kg)  SpO2 96%  BMI 22.19 kg/m2    Medications reconciled to facility chart and changes were made to reflect current medications as identified as above med list. Below are the changes that were made:   Medications stopped since last EPIC medication reconciliation:   There are no discontinued medications.    Medications started since last [facility] medication reconciliation:  Orders Placed This Encounter   Medications     Acetaminophen (TYLENOL PO)     Sig: Take 650 mg by mouth every 4 hours as needed for mild pain or fever     bisacodyl (DULCOLAX) 10 MG Suppository     Sig: Place 10 mg rectally every 72 hours as needed for constipation     divalproex (DEPAKOTE ER) 250 MG 24 hr tablet     Sig: Take 250 mg by mouth 2 times daily     GENERAL APPEARANCE:  Alert, in no distress  Psychiatric: mentation appears abnormal - dementia, concentration poor, confused and disoriented      Assessment:   Diagnosis Comments   1. Late onset Alzheimer's disease with behavioral disturbance  Start Depakote 250mg BID - taper up as needed  Will taper down Seroquel to hopeful d/c of medication  Will F/u     Keely Dozier, GNP  [facility]

## 2017-07-27 NOTE — PROGRESS NOTES
Carlita Irizarry is a 89 year old female seen July 27, 2017 at [facility] where she has resided for one year (admit 8/2016 to [facility] side) seen to follow up dementia with behavioral disturbance and frequent falls.   She is seen on the unit, up to WC at the lunch table, where she has had some trouble getting along with other residents.   However, today she is talking with a staff member and is pleasant.   Nursing reports sundowning, and she has failed trial of quetiapine GDR.     Can be suspicious and combative at times.           By chart review she has progressive dementia, on donepezil for the past 5 years.   She has had recurrent falls, and was seen in ER for confusion, disorientation and hallucinations 3 years ago    She is also treated for hypothyroidism and anxiety.      PMH:  Alzheimer's dementia, dx 2011  Anxiety / depression  Hypothyroidism  S/p open left elbow fx, 2012    SH:  , previously lived in a Senior Apartment.   Has one son Fredis, who is POA.     ROS:  Limited, but negative except for above.    Weight is stable.      EXAM:  NAD    /60  Pulse 76  Temp 97  F (36.1  C)  Resp 20  Wt 109 lb 14.4 oz (49.9 kg)  SpO2 96%  BMI 22.19 kg/m2  Neck supple without adenopathy  Lungs clear bilaterally with fair air movement  Heart RRR s1s2, without murmur  Abd soft, NT, no distention, +BS  Ext without edema  Neuro: no focal findings, no tremor or stiffness, limited history  Psych: affect okay    TSH   Date Value Ref Range Status   01/10/2017 3.60 0.30 - 5.00 ulU/mL Final   Last Basic Metabolic Panel:  Lab Results   Component Value Date     01/10/2017      Lab Results   Component Value Date    POTASSIUM 4.2 05/25/2017     Lab Results   Component Value Date    CHLORIDE 107 01/10/2017     Lab Results   Component Value Date    OZ 8.4 01/10/2017     Lab Results   Component Value Date    CO2 21 01/10/2017     Lab Results   Component Value Date    BUN 23 01/10/2017     Lab Results    Component Value Date    CR 0.71 05/25/2017     Lab Results   Component Value Date    GLC 85 05/25/2017         IMP/PLAN:  (E03.9) Hypothyroidism, unspecified type    Comment: on replacement, at goal TSH  Plan: yearly recheck    (G30.1,  F02.81) Late onset Alzheimer's disease with behavioral disturbance  Comment: with suspiciousness, loss of language, orientation and combative behaviors.   Gets into altercations with other residents.     Plan: [facility] unit for assist with meds, meals, activity, safety.   Continue quetiapine to lower her distress and promote safety for patient and other residents.      (F41.9) Anxiety disorder, unspecified type  Comment: and depression  Plan: continue Angela Fitch MD

## 2017-08-01 NOTE — PROGRESS NOTES
[facility]    Chief Complaint   Patient presents with     RECHECK       HPI:    Carlita Irizarry is a 89 year old  (1/2/1924), who is being seen today for an episodic care visit at [facility].  HPI information obtained from: facility chart records, facility staff, patient report and [facility] chart review.      Today's concern is:  Anxiety disorder, unspecified type  Late onset Alzheimer's disease with behavioral disturbance  Major depressive disorder with single episode, remission status unspecified  Being seen today for continued behaviors. Staff reports some improvement since initiation of depakote. However, there are still times when she has to sit away from other patients at lunch due to behaviors and altercations. She is pleasant on exam today, and was able to eat with the other residents at lunch today. She does have some sun-downing behaviors in late afternoon, evening. Have been attempting to wean off Seroquel as not felt to be effective. LFT drawn on 5/11/17 were WNL.      ALLERGIES: Review of patient's allergies indicates no known allergies.  Past Medical, Surgical, Family and Social History reviewed and updated in [facility].    Current Outpatient Prescriptions   Medication Sig Dispense Refill     Acetaminophen (TYLENOL PO) Take 650 mg by mouth every 4 hours as needed for mild pain or fever       bisacodyl (DULCOLAX) 10 MG Suppository Place 10 mg rectally every 72 hours as needed for constipation       divalproex (DEPAKOTE ER) 250 MG 24 hr tablet Take 250 mg by mouth 2 times daily       QUEtiapine Fumarate (SEROQUEL PO) Take 25 mg by mouth 3 times daily       ASPIRIN PO Take 81 mg by mouth daily       calcium carb 1250 mg, 500 mg [place],/vitamin D 200 units (OSCAL WITH D) 500-200 MG-UNIT per tablet Take 1 tablet by mouth 2 times daily (with meals)       Mirtazapine (REMERON PO) Take 15 mg by mouth daily       LEVOTHYROXINE SODIUM PO Take 75 mcg by mouth daily         DONEPEZIL HCL PO Take 10 mg by mouth every evening.       Medications reviewed:  Medications reconciled to facility chart and changes were made to reflect current medications as identified as above med list. Below are the changes that were made:   Medications stopped since last EPIC medication reconciliation:   There are no discontinued medications.    Medications started since last [facility] medication reconciliation:  No orders of the defined types were placed in this encounter.        REVIEW OF SYSTEMS:  Unobtainable secondary to cognitive impairment or aphasia, but today pt denies pain, or SOB, reports good appetite.    Physical Exam:  /58  Pulse 93  Temp 98.8  F (37.1  C)  Resp 20  Wt 116 lb (52.6 kg)  SpO2 94%  BMI 23.42 kg/m2  GENERAL APPEARANCE:  Alert, in no distress, cooperative  RESP:  respiratory effort and palpation of chest normal, lungs clear to auscultation , no respiratory distress  CV:  Palpation and auscultation of heart done , regular rate and rhythm, no murmur, rub, or gallop, no edema, +2 pedal pulses  ABDOMEN:  normal bowel sounds, soft, nontender, no hepatosplenomegaly or other masses, no guarding or rebound  M/S:   Gait and station abnormal no joint tenderness, primarily WC bound  SKIN:  Inspection of skin and subcutaneous tissue baseline, Palpation of skin and subcutaneous tissue baseline, few bruising to left forearm, healing well  NEURO:   Cranial nerves 2-12 are normal tested and grossly at patient's baseline, Examination of sensation by touch normal  PSYCH:  oriented to self, insight and judgement impaired, memory impaired , affect and mood normal    Recent Labs:      CBC RESULTS:   Recent Labs   Lab Test 01/10/17 01/09/17   WBC  4.8  5.6   RBC  4.76  4.92   HGB  13.6  13.9   HCT  44.1  45.3   MCV  93  92   MCH  28.6  28.3   MCHC  30.8*  30.7*   RDW  14.3  14.1   PLT  219  261       Last Basic Metabolic Panel:  Recent Labs   Lab Test 05/25/17 01/10/17 01/09/17   NA   --   139   141   POTASSIUM  4.2  4.0  4.5   CHLORIDE   --   107  106   OZ   --   8.4*  8.8   CO2   --   21*  26   BUN   --   23  24   CR  0.71  0.67  0.74   GLC  85  82  95       Liver Function Studies -   Recent Labs   Lab Test 05/11/17 07/06/13   1326   PROTTOTAL   --   6.6*   ALBUMIN   --   3.9   BILITOTAL   --   0.6   ALKPHOS   --   68   AST  14  31   ALT  14  33       TSH   Date Value Ref Range Status   01/10/2017 3.60 0.30 - 5.00 ulU/mL Final   07/01/2012 1.22 0.4 - 5.0 mU/L Final           Assessment/Plan:  (F41.9) Anxiety disorder, unspecified type  (primary encounter diagnosis)  (G30.1,  F02.81) Late onset Alzheimer's disease with behavioral disturbance  (F32.9) Major depressive disorder with single episode, remission status unspecified  Comment: Behaviors improved, but remain remain problematic - has agitation and agressive behaviors at times. Requires 24 hours nursing cares.   Plan: As had some improvement with Depakote will increase to 250mg TID, will decrease Seroquel to 12.5 mg TID.       Electronically signed by  MICHAEL Allen [facility]

## 2017-08-08 NOTE — PROGRESS NOTES
"[facility]    Chief Complaint   Patient presents with     RECHECK       HPI:    Carlita Irizarry is a 89 year old  (1/2/1924), who is being seen today for an episodic care visit at [facility].    HPI information obtained from: facility chart records, facility staff and [facility] chart review.     Today's concern is:  Late onset Alzheimer's disease with behavioral disturbance  Being seen today for continued behaviors. Staff reports some improvement since initiation of depakote. However, there are still times when she has to sit away from other patients at lunch due to behaviors and altercations. She is pleasant on exam today however, she has spent most of the morning wheeling up and down the halls making sure staff are \"handling things well\" - she is very vocal today. Staff note that patient has periods of calm, but these seem to build up to periods of agitation  Currently on Depakote 250mg TID    REVIEW OF SYSTEMS:  4 point ROS including Respiratory, CV, GI and , other than that noted in the HPI,  is negative    /58  Pulse 93  Temp 98.8  F (37.1  C)  Resp 20  Wt 111 lb (50.3 kg)  SpO2 94%  BMI 22.41 kg/m2  GENERAL APPEARANCE:  Alert, in no distress  Psychiatric: mentation appears abnormal - dementia, concentration poor, confused and disoriented    ASSESSMENT/PLAN:   Diagnosis Comments   1. Late onset Alzheimer's disease with behavioral disturbance  Increase Depakote to 250mg QID  Continue to monitor closely     DEVON Huang  [facility]    "

## 2017-08-29 NOTE — PROGRESS NOTES
[facility]    Chief Complaint   Patient presents with     RECHECK       HPI:    Carlita Irizarry is a 89 year old  (1/2/1924), who is being seen today for an episodic care visit at [facility].    HPI information obtained from: facility chart records, facility staff and [facility] chart review.     Today's concern is:  Late onset Alzheimer's disease with behavioral disturbance  Being seen today for continued behaviors. Staff reports some improvement since initiation of depakote. However, there are still times when she has to sit away from other patients at lunch due to behaviors and altercations. Today she is sleeping upon examination - staff note she did not sleep well last night - they state that there was minimal change in behaviors following increase to QID dosing  Currently on Depakote 250mg QID    REVIEW OF SYSTEMS:  4 point ROS including Respiratory, CV, GI and , other than that noted in the HPI,  is negative    /72  Pulse 82  Temp 98  F (36.7  C)  Resp 18  Wt 112 lb 14.4 oz (51.2 kg)  SpO2 95%  BMI 22.79 kg/m2  GENERAL APPEARANCE:  Alert, somnolent, in no distress  Psychiatric: mentation appears abnormal - dementia, concentration poor, confused and disoriented    ASSESSMENT/PLAN:   Diagnosis Comments   1. Late onset Alzheimer's disease with behavioral disturbance  Increase Depakote to 375mg QID  Continue to monitor closely     DEVON Huang  [facility]

## 2017-09-11 NOTE — PROGRESS NOTES
"[facility]    Chief Complaint   Patient presents with     RECHECK       HPI:    Carlita Irizarry is a 89 year old  (1/2/1924), who is being seen today for an episodic care visit at [facility].  HPI information obtained from: facility chart records, facility staff, patient report and [facility] chart review.      Today's concern is:  Aggressive behavior  Agitation  Late onset Alzheimer's disease with behavioral disturbance  Nursing reports resident is being \"nasty\" with staff and other residents. Currently resides in locked memory care until at facility. BIMS 03/15.   -Staff reports resident has also had an increase in sleepiness. Does not wake much during day shift hours. Depakote increased to 375 mg QID on 8/15/17.    ALLERGIES: Review of patient's allergies indicates no known allergies.  Past Medical, Surgical, Family and Social History reviewed and updated in [facility].    Current Outpatient Prescriptions   Medication Sig Dispense Refill     divalproex (DEPAKOTE SPRINKLE) 125 MG CR capsule Take by mouth 4 times daily 3 capsules       Acetaminophen (TYLENOL PO) Take 650 mg by mouth every 4 hours as needed for mild pain or fever       bisacodyl (DULCOLAX) 10 MG Suppository Place 10 mg rectally every 72 hours as needed for constipation       ASPIRIN PO Take 81 mg by mouth daily       calcium carb 1250 mg, 500 mg [place],/vitamin D 200 units (OSCAL WITH D) 500-200 MG-UNIT per tablet Take 1 tablet by mouth 2 times daily (with meals)       Mirtazapine (REMERON PO) Take 15 mg by mouth daily       LEVOTHYROXINE SODIUM PO Take 75 mcg by mouth daily        DONEPEZIL HCL PO Take 10 mg by mouth every evening.       Medications reviewed:  Medications reconciled to facility chart and changes were made to reflect current medications as identified as above med list. Below are the changes that were made:   Medications stopped since last EPIC medication reconciliation:   Medications Discontinued " During This Encounter   Medication Reason     divalproex (DEPAKOTE ER) 250 MG 24 hr tablet Dose adjustment     QUEtiapine Fumarate (SEROQUEL PO) Medication Reconciliation Clean Up       Medications started since last [facility] medication reconciliation:  Orders Placed This Encounter   Medications     divalproex (DEPAKOTE SPRINKLE) 125 MG CR capsule     Sig: Take by mouth 4 times daily 3 capsules         REVIEW OF SYSTEMS:  Unobtainable secondary to cognitive impairment or aphasia.    Physical Exam:  /63  Pulse 66  Temp 97.6  F (36.4  C)  Resp 20  Wt 110 lb 1.6 oz (49.9 kg)  SpO2 94%  BMI 22.23 kg/m2  GENERAL APPEARANCE:  Sleepy. Was able to wake with stimulation  RESP:  respiratory effort and palpation of chest normal, lungs clear to auscultation   CV:  Palpation and auscultation of heart done , regular rate and rhythm, no murmur, rub, or gallop  ABDOMEN:  normal bowel sounds, soft, nontender, no hepatosplenomegaly or other masses  SKIN:  Inspection of skin and subcutaneous tissue baseline, Palpation of skin and subcutaneous tissue baseline  NEURO:   Cranial nerves 2-12 are normal tested and grossly at patient's baseline  PSYCH:  insight and judgement impaired, memory impaired , affect and mood normal    Recent Labs:      CBC RESULTS:   Recent Labs   Lab Test 01/10/17 01/09/17   WBC  4.8  5.6   RBC  4.76  4.92   HGB  13.6  13.9   HCT  44.1  45.3   MCV  93  92   MCH  28.6  28.3   MCHC  30.8*  30.7*   RDW  14.3  14.1   PLT  219  261       Last Basic Metabolic Panel:  Recent Labs   Lab Test 05/25/17 01/10/17 01/09/17   NA   --   139  141   POTASSIUM  4.2  4.0  4.5   CHLORIDE   --   107  106   OZ   --   8.4*  8.8   CO2   --   21*  26   BUN   --   23  24   CR  0.71  0.67  0.74   GLC  85  82  95       Liver Function Studies -   Recent Labs   Lab Test 05/11/17 07/06/13   1326   PROTTOTAL   --   6.6*   ALBUMIN   --   3.9   BILITOTAL   --   0.6   ALKPHOS   --   68   AST  14  31   ALT  14  33       TSH   Date Value  Ref Range Status   01/10/2017 3.60 0.30 - 5.00 ulU/mL Final   07/01/2012 1.22 0.4 - 5.0 mU/L Final               Assessment/Plan:  (R45.89) Aggressive behavior  (primary encounter diagnosis)  (R45.1) Agitation  (G30.1,  F02.81) Late onset Alzheimer's disease with behavioral disturbance  Per RN resident's behaviors are baseline. Does report resident has had an increase in sleepiness and is difficult to wake during day shift hours.   -Reduce Depakote 250 mg/QID  -CBC with diff r/o infectious process  -Continue to monitor closely and notify NP with changes.     Orders:  -See above for new orders.     Total time spent with patient visit at the skilled nursing facility was 35 min including patient visit and review of past records. Greater than 50% of total time spent with counseling and coordinating care due to complexity of care    Electronically signed by  MICHAEL Porras CNP  [facility]

## 2017-09-18 NOTE — PROGRESS NOTES
[place] [facility]  Chief Complaint   Patient presents with     Annual Comprehensive Nursing Home       HPI:    Carlita Irizarry is a 89 year old  (1/2/1924), who is being seen today for an annual comprehensive visit at [facility].  HPI information obtained from: facility chart records.      Today's concerns are:  Late onset Alzheimer's disease with behavioral disturbance  -Last BIMS 3/15  -Per nursing staff, resident had been sleeping more throughout the day. Depakote was reduced to 350 mg/QID. Much improvement since dose reduction. Was up and eating breakfast and interacting more since 9/11/17.     Anxiety disorder, unspecified type  - Per chart review, appears stable.     Acquired hypothyroidism  -  TSH   Date Value Ref Range Status   01/10/2017 3.60 0.30 - 5.00 ulU/mL Final   -Asymptomatic. Currently taking synthroid 75 mcg/day.     Based on JNC-8 goals,  patients age of 89 year old, no presence of diabetes or CKD, and goals of care goal BP is <150/90 mm Hg. patient is stable and continue without pharmacological invention with routine assessment.    Depression screen done: PHQ-9 Given screen score and clinical assessment patient is stable on current plan of care/interventions of Remeron and on going assessment will continue.    ALLERGIES: Review of patient's allergies indicates no known allergies.  PROBLEM LIST:  Patient Active Problem List   Diagnosis     Open fracture of elbow     Pure hypercholesterolemia     Major depressive disorder with single episode, remission status unspecified     Anxiety disorder, unspecified type     Unspecified psychosis not due to a substance or known physiological condition     Late onset Alzheimer's disease with behavioral disturbance     Acquired hypothyroidism     Decreased appetite     PAST MEDICAL HISTORY:  has a past medical history of Alzheimer disease; High cholesterol; and Thyroid disease.  PAST SURGICAL HISTORY:  has a past surgical history  that includes Irrigation and debridement upper extremity, combined (6/30/2012).  FAMILY HISTORY: family history is not on file.  SOCIAL HISTORY:  reports that she has quit smoking. She does not have any smokeless tobacco history on file. She reports that she drinks alcohol. She reports that she does not use illicit drugs.  IMMUNIZATIONS:  Most Recent Immunizations   Administered Date(s) Administered     Influenza (IIV3) 10/20/2016     Pneumococcal (PCV 13) 09/26/2016     TDAP Vaccine (Adacel) 06/30/2012     Above immunizations pulled from [facility]. [facility] and facility records also reconciled. Outstanding information sent to  to update [facility] .  Future immunizations are not needed at this point as all recommended immunizations are up to date.   MEDICATIONS:  Current Outpatient Prescriptions   Medication Sig Dispense Refill     LORazepam (ATIVAN) 0.5 MG tablet Take 0.5 mg by mouth as needed for anxiety give prior to dental appt       divalproex (DEPAKOTE SPRINKLE) 125 MG CR capsule Take 250 mg by mouth 4 times daily        Acetaminophen (TYLENOL PO) Take 650 mg by mouth every 4 hours as needed for mild pain or fever       bisacodyl (DULCOLAX) 10 MG Suppository Place 10 mg rectally every 72 hours as needed for constipation       ASPIRIN PO Take 81 mg by mouth daily       calcium carb 1250 mg, 500 mg [place],/vitamin D 200 units (OSCAL WITH D) 500-200 MG-UNIT per tablet Take 1 tablet by mouth 2 times daily (with meals)       Mirtazapine (REMERON PO) Take 15 mg by mouth daily       LEVOTHYROXINE SODIUM PO Take 75 mcg by mouth daily        DONEPEZIL HCL PO Take 10 mg by mouth every evening.       Medications reviewed:  Medications reconciled to facility chart and changes were made to reflect current medications as identified as above med list. Below are the changes that were made:   Medications stopped since last EPIC medication reconciliation:   There are no discontinued medications.    Medications  started since last EPIC medication reconciliation:  Orders Placed This Encounter   Medications     LORazepam (ATIVAN) 0.5 MG tablet     Sig: Take 0.5 mg by mouth as needed for anxiety give prior to dental appt     Case Management:  I have reviewed the Assisted Living care plan, current immunizations and preventive care/cancer screening..Future cancer screening is not clinically indicated secondary to age/goals of care Patient's desire to return to the community is not assessible due to cognitive impairment. Current Level of Care is appropriate.    Advance Directive Discussion:    I reviewed the current advanced directives as reflected in EPIC, the POLST and the facility chart, and verified the congruency of orders. I contacted the first party [facility] and left a message regarding the plan of Care.  I did not due to cognitive impairment review the advance directives with the resident.     Team Discussion:  I communicated with the appropriate disciplines involved with the Plan of Care: No changes at this time. In agreement with POC    Patient Goal:  Patient's goal is pain control and comfort.    Information reviewed:  Medications, vital signs, orders, and nursing notes.    ROS:  Unobtainable secondary to cognitive impairment or aphasia.    Exam:  /66  Pulse 82  Temp 98.8  F (37.1  C)  Resp 20  Wt 109 lb 8 oz (49.7 kg)  SpO2 97%  BMI 22.1 kg/m2    GENERAL APPEARANCE:  Alert, in no distress  ENT:  Mouth and posterior oropharynx normal, moist mucous membranes  RESP:  respiratory effort and palpation of chest normal, lungs clear to auscultation , no respiratory distress  CV:  Palpation and auscultation of heart done , regular rate and rhythm, no murmur, rub, or gallop  ABDOMEN:  normal bowel sounds, soft, nontender, no hepatosplenomegaly or other masses  M/S:   Gait and station normal  Digits and nails normal  SKIN:  Inspection of skin and subcutaneous tissue baseline, Palpation of skin and subcutaneous  tissue baseline  NEURO:   Cranial nerves 2-12 are normal tested and grossly at patient's baseline  PSYCH:  insight and judgement impaired, memory impaired , affect and mood normal      BP Readin/63, 121/58, 106/55          HR:  66-82    Lab/Diagnostic data:    CBC RESULTS:   Recent Labs   Lab Test 01/10/17 01/09/17   WBC  4.8  5.6   RBC  4.76  4.92   HGB  13.6  13.9   HCT  44.1  45.3   MCV  93  92   MCH  28.6  28.3   MCHC  30.8*  30.7*   RDW  14.3  14.1   PLT  219  261       Last Basic Metabolic Panel:  Recent Labs   Lab Test 05/25/17 01/10/17 01/09/17   NA   --   139  141   POTASSIUM  4.2  4.0  4.5   CHLORIDE   --   107  106   OZ   --   8.4*  8.8   CO2   --   21*  26   BUN   --   23  24   CR  0.71  0.67  0.74   GLC  85  82  95       Liver Function Studies -   Recent Labs   Lab Test 17   1326   PROTTOTAL   --   6.6*   ALBUMIN   --   3.9   BILITOTAL   --   0.6   ALKPHOS   --   68   AST  14  31   ALT  14  33       TSH   Date Value Ref Range Status   01/10/2017 3.60 0.30 - 5.00 ulU/mL Final   2012 1.22 0.4 - 5.0 mU/L Final       ASSESSMENT/PLAN  (E03.9) Hypothyroidism, unspecified type    Comment: on replacement, at goal TSH.  Plan: .Check TSH yearly and PRN, and keep level b/w 4-5 in elderly. Continue Synthroid 75 mcg/day.   TSH   Date Value Ref Range Status   01/10/2017 3.60 0.30 - 5.00 ulU/mL Final      (G30.1,  F02.81) Late onset Alzheimer's disease with behavioral disturbance  Comment: with suspiciousness, loss of language, orientation and combative behaviors. No recent altercations with other residents.     Plan: Board and Care Memory Care unit for assist with meds, meals, activity, safety.   Continue quetiapine to lower her distress and promote safety for patient and other residents. Chronic, progressive. Needs 24 hour skilled nursing care. HPI/ROS difficult to obtain with cognitive impairment. Expect further functional and cognitive decline. Expect weight loss. Continue 24 hour  care. Monitor weight. Monitor functional status. Monitor for behavioral disturbances.     (F41.9) Anxiety disorder, unspecified type  Comment: and depression. Appears stable  Plan: continue Remeron as ordered. No dose reduction at this time d/t stability.     Orders:  The current medical regimen is effective; continue present plan and medications.    Electronically signed by:  MICHAEL Price CNP

## 2017-10-27 NOTE — PROGRESS NOTES
[place] [facility]    Chief Complaint   Patient presents with     RECHECK       HPI:    Carlita Irizarry is a 89 year old  (1/2/1924), who is being seen today for an episodic care visit at [facility].  HPI information obtained from: facility chart records, facility staff, patient report and [facility] chart review.      Today's concern is:  Adult failure to thrive  Sleepiness  Continues to have excessive daytime sleepiness despite several dose reductions of mediations. Weights stable 108 +/- 5 lbs. Denies appetite and eating <65% of meals.     Late onset Alzheimer's disease with behavioral disturbance  BIMS 02/15.  -No recent behaviors. Last dose reduction of of Depakote was on 9/11/17    ALLERGIES: Review of patient's allergies indicates no known allergies.  Past Medical, Surgical, Family and Social History reviewed and updated in [facility].    Current Outpatient Prescriptions   Medication Sig Dispense Refill     LORazepam (ATIVAN) 0.5 MG tablet Take 0.5 mg by mouth as needed for anxiety give prior to dental appt       divalproex (DEPAKOTE SPRINKLE) 125 MG CR capsule Take 250 mg by mouth 4 times daily        Acetaminophen (TYLENOL PO) Take 650 mg by mouth every 4 hours as needed for mild pain or fever       bisacodyl (DULCOLAX) 10 MG Suppository Place 10 mg rectally every 72 hours as needed for constipation       ASPIRIN PO Take 81 mg by mouth daily       calcium carb 1250 mg, 500 mg [place],/vitamin D 200 units (OSCAL WITH D) 500-200 MG-UNIT per tablet Take 1 tablet by mouth 2 times daily (with meals)       Mirtazapine (REMERON PO) Take 15 mg by mouth daily       LEVOTHYROXINE SODIUM PO Take 75 mcg by mouth daily        DONEPEZIL HCL PO Take 10 mg by mouth every evening.       Medications reviewed:  Medications reconciled to facility chart and changes were made to reflect current medications as identified as above med list. Below are the changes that were made:   Medications stopped since  last EPIC medication reconciliation:   There are no discontinued medications.    Medications started since last [facility] medication reconciliation:  No orders of the defined types were placed in this encounter.      REVIEW OF SYSTEMS:  Unobtainable secondary to cognitive impairment or aphasia.    Physical Exam:  /69  Pulse 86  Temp 98.7  F (37.1  C)  Resp 16  Wt 109 lb 12.8 oz (49.8 kg)  SpO2 98%  BMI 22.16 kg/m2  GENERAL APPEARANCE:  Alert  RESP:  respiratory effort and palpation of chest normal, lungs clear to auscultation , no respiratory distress  CV:  Palpation and auscultation of heart done , regular rate and rhythm, no murmur, rub, or gallop  M/S:   Gait and station normal  Digits and nails normal  SKIN:  Inspection of skin and subcutaneous tissue baseline, Palpation of skin and subcutaneous tissue baseline  NEURO:   Cranial nerves 2-12 are normal tested and grossly at patient's baseline  PSYCH:  memory impaired , affect and mood normal    Recent Labs:      CBC RESULTS:   Recent Labs   Lab Test 09/14/17 01/10/17   WBC  6.4  4.8   RBC  5.19  4.76   HGB  14.9  13.6   HCT  47.9*  44.1   MCV  92  93   MCH  28.7  28.6   MCHC  31.1*  30.8*   RDW  14.7*  14.3   PLT  198  219       Last Basic Metabolic Panel:  Recent Labs   Lab Test 05/25/17 01/10/17 01/09/17   NA   --   139  141   POTASSIUM  4.2  4.0  4.5   CHLORIDE   --   107  106   OZ   --   8.4*  8.8   CO2   --   21*  26   BUN   --   23  24   CR  0.71  0.67  0.74   GLC  85  82  95       Liver Function Studies -   Recent Labs   Lab Test 05/11/17 07/06/13   1326   PROTTOTAL   --   6.6*   ALBUMIN   --   3.9   BILITOTAL   --   0.6   ALKPHOS   --   68   AST  14  31   ALT  14  33       TSH   Date Value Ref Range Status   01/10/2017 3.60 0.30 - 5.00 ulU/mL Final   07/01/2012 1.22 0.4 - 5.0 mU/L Final           Assessment/Plan:  (R62.7) Adult failure to thrive  (primary encounter diagnosis)  (R40.0) Sleepiness  (G30.1,  F02.81) Late onset Alzheimer's disease  with behavioral disturbance  -Decrease Depakote 150 TID     Orders:  See above     Total time spent with patient visit at the skilled nursing facility was 25 min including patient visit and review of past records. Greater than 50% of total time spent with counseling and coordinating care due to Adult failure to thrive     Electronically signed by  MICHAEL [facility]

## 2017-11-29 NOTE — PROGRESS NOTES
Carlita Irizarry is a 89 year old female seen November 28, 2017 at [facility] where she has resided for one year (admit 8/2016 to Board and Care side) seen to follow up dementia with behavioral disturbance and frequent falls.   Patient is seen on the unit, up to WC.   [place], and not able to give any history.     Patient has progressive dementia with behavioral symptoms.  However her cognition has worsened and behaviors now mostly resolved.   Medications cut back due to daytime sleepiness, which nonetheless persists.        She is also treated for hypothyroidism and anxiety.      PMH:  Alzheimer's dementia, dx 2011  Anxiety / depression  Hypothyroidism  S/p open left elbow fx, 2012    SH:  , previously lived in a Senior Apartment.   Has one son Fredis, who is POA.     ROS:  Limited, but negative except for above.    Weight is stable.      EXAM:  NAD  /67  Pulse 70  Temp (!) 88  F (31.1  C)  Resp 16  Wt 110 lb (49.9 kg)  SpO2 98%  BMI 22.21 kg/m2   Neck supple without adenopathy  Lungs clear bilaterally with fair air movement  Heart RRR s1s2, without murmur  Abd soft, NT, no distention, +BS  Ext without edema  Neuro: no focal findings, no tremor or stiffness, limited history  Psych: affect okay    TSH   Date Value Ref Range Status   01/10/2017 3.60 0.30 - 5.00 ulU/mL Final     Lab Results   Component Value Date    WBC 6.4 09/14/2017     Component Value Date    HGB 14.9 09/14/2017     Component Value Date    MCV 92 09/14/2017     Component Value Date     09/14/2017       IMP/PLAN:  (E03.9) Hypothyroidism, unspecified type    Comment: on replacement, at goal TSH  Plan: yearly recheck    (G30.1,  F02.81) Late onset Alzheimer's disease with behavioral disturbance  Comment: advanced, and combative behaviors now decreased      Plan: Board and Care Memory Care unit for assist with meds, meals, activity, safety.   Continue to decrease Depakote dose, would also look to wean off donepezil given age  and advanced disease.       (F41.9) Anxiety disorder, unspecified type  Comment: and depression  Plan: continue mirtazapine       Kyung Fitch MD

## 2018-01-01 ENCOUNTER — NURSING HOME VISIT (OUTPATIENT)
Dept: GERIATRICS | Facility: CLINIC | Age: 83
End: 2018-01-01
Payer: COMMERCIAL

## 2018-01-01 VITALS
OXYGEN SATURATION: 97 % | TEMPERATURE: 97.2 F | SYSTOLIC BLOOD PRESSURE: 114 MMHG | RESPIRATION RATE: 18 BRPM | BODY MASS INDEX: 20.65 KG/M2 | HEART RATE: 90 BPM | DIASTOLIC BLOOD PRESSURE: 62 MMHG | WEIGHT: 102.3 LBS

## 2018-01-01 VITALS
OXYGEN SATURATION: 94 % | SYSTOLIC BLOOD PRESSURE: 120 MMHG | BODY MASS INDEX: 19.02 KG/M2 | RESPIRATION RATE: 18 BRPM | WEIGHT: 94.2 LBS | HEART RATE: 74 BPM | TEMPERATURE: 97.8 F | DIASTOLIC BLOOD PRESSURE: 77 MMHG

## 2018-01-01 VITALS
BODY MASS INDEX: 21.3 KG/M2 | HEART RATE: 93 BPM | OXYGEN SATURATION: 98 % | RESPIRATION RATE: 18 BRPM | DIASTOLIC BLOOD PRESSURE: 74 MMHG | SYSTOLIC BLOOD PRESSURE: 93 MMHG | TEMPERATURE: 98.6 F | WEIGHT: 105.5 LBS

## 2018-01-01 DIAGNOSIS — E03.9 HYPOTHYROIDISM, UNSPECIFIED TYPE: Primary | ICD-10-CM

## 2018-01-01 DIAGNOSIS — F32.9 MAJOR DEPRESSIVE DISORDER WITH SINGLE EPISODE, REMISSION STATUS UNSPECIFIED: ICD-10-CM

## 2018-01-01 DIAGNOSIS — G30.1 LATE ONSET ALZHEIMER'S DISEASE WITHOUT BEHAVIORAL DISTURBANCE (H): Primary | ICD-10-CM

## 2018-01-01 DIAGNOSIS — G30.1 LATE ONSET ALZHEIMER'S DISEASE WITH BEHAVIORAL DISTURBANCE (H): ICD-10-CM

## 2018-01-01 DIAGNOSIS — F02.80 LATE ONSET ALZHEIMER'S DISEASE WITHOUT BEHAVIORAL DISTURBANCE (H): Primary | ICD-10-CM

## 2018-01-01 DIAGNOSIS — F02.80 LATE ONSET ALZHEIMER'S DISEASE WITHOUT BEHAVIORAL DISTURBANCE (H): ICD-10-CM

## 2018-01-01 DIAGNOSIS — E03.9 ACQUIRED HYPOTHYROIDISM: Primary | ICD-10-CM

## 2018-01-01 DIAGNOSIS — G30.1 LATE ONSET ALZHEIMER'S DISEASE WITHOUT BEHAVIORAL DISTURBANCE (H): ICD-10-CM

## 2018-01-01 DIAGNOSIS — E03.9 HYPOTHYROIDISM, UNSPECIFIED TYPE: ICD-10-CM

## 2018-01-01 DIAGNOSIS — F02.818 LATE ONSET ALZHEIMER'S DISEASE WITH BEHAVIORAL DISTURBANCE (H): ICD-10-CM

## 2018-01-01 DIAGNOSIS — Z51.5 HOSPICE CARE PATIENT: ICD-10-CM

## 2018-01-01 PROCEDURE — 99309 SBSQ NF CARE MODERATE MDM 30: CPT | Mod: GW | Performed by: NURSE PRACTITIONER

## 2018-01-01 PROCEDURE — 99308 SBSQ NF CARE LOW MDM 20: CPT | Mod: GW | Performed by: INTERNAL MEDICINE

## 2018-01-18 NOTE — PROGRESS NOTES
[place] [facility]    Chief Complaint   Patient presents with     [employer] Regulatory       HPI:    Carlita Irizarry is a 89 year old  (1/2/1924), who is being seen today for a federally mandated E/M visit at [facility].  HPI information obtained from: facility chart records, facility staff and patient report.     Today's concerns are:  Acquired hypothyroidism  TSH   Date Value Ref Range Status   01/10/2017 3.60 0.30 - 5.00 ulU/mL Final   Currently taking synthroid 75 mcg/day. Asymptomatic.     Late onset Alzheimer's disease with behavioral disturbance  No recent behaviors noted by staff. Depakote recently discontinued d/t increased sleepiness.   Weight stable 107 +/1 5 lbs. Unable to perform cognitive assessment 2/2 severe cognitive impairment.     Major depressive disorder with single episode, remission status unspecified  Mood stable. Unable to perform PHQ-9 2/2 cognitive impairment.     Hospice care patient  Enrolled with [facility] on 1/6/17 with goal of care focused on comfort.       ALLERGIES: No known allergies  PAST MEDICAL HISTORY:  has a past medical history of Alzheimer disease; High cholesterol; and Thyroid disease.  PAST SURGICAL HISTORY:  has a past surgical history that includes Irrigation and debridement upper extremity, combined (6/30/2012).  FAMILY HISTORY: family history is not on file.  SOCIAL HISTORY:  reports that she has quit smoking. She does not have any smokeless tobacco history on file. She reports that she drinks alcohol. She reports that she does not use illicit drugs.    MEDICATIONS:  Current Outpatient Prescriptions   Medication Sig Dispense Refill     Acetaminophen (TYLENOL PO) Take 650 mg by mouth every 4 hours as needed for mild pain or fever       bisacodyl (DULCOLAX) 10 MG Suppository Place 10 mg rectally every 72 hours as needed for constipation       ASPIRIN PO Take 81 mg by mouth daily       LEVOTHYROXINE SODIUM PO Take 75 mcg by mouth daily         Medications reviewed:  Medications reconciled to facility chart and changes were made to reflect current medications as identified as above med list. Below are the changes that were made:   Medications stopped since last EPIC medication reconciliation:   Medications Discontinued During This Encounter   Medication Reason     calcium carb 1250 mg, 500 mg [place],/vitamin D 200 units (OSCAL WITH D) 500-200 MG-UNIT per tablet Discontinued by another Health Care Provider     divalproex (DEPAKOTE SPRINKLE) 125 MG CR capsule Discontinued by another Health Care Provider     DONEPEZIL HCL PO Discontinued by another Health Care Provider     LORazepam (ATIVAN) 0.5 MG tablet Discontinued by another Health Care Provider     Mirtazapine (REMERON PO) Discontinued by another Health Care Provider       Medications started since last [facility] medication reconciliation:  No orders of the defined types were placed in this encounter.      Case Management:  I have reviewed the care plan and MDS and do agree with the plan. Patient's desire to return to the community is not assessible due to cognitive impairment.  Information reviewed:  Medications, vital signs, orders, and nursing notes.    ROS:  Unobtainable secondary to cognitive impairment or aphasia.    Exam:  Vitals: BP 93/74  Pulse 93  Temp 98.6  F (37  C)  Resp 18  Wt 105 lb 8 oz (47.9 kg)  SpO2 98%  BMI 21.3 kg/m2  BMI= Body mass index is 21.3 kg/(m^2).  GENERAL APPEARANCE:  Alert, in no distress  RESP:  respiratory effort and palpation of chest normal, lungs clear to auscultation , no respiratory distress  CV:  Palpation and auscultation of heart done , regular rate and rhythm, no murmur, rub, or gallop  M/S:   Gait and station normal  Digits and nails normal  SKIN:  Inspection of skin and subcutaneous tissue baseline, Palpation of skin and subcutaneous tissue baseline  NEURO:   Cranial nerves 2-12 are normal tested and grossly at patient's baseline     BP Readin/65,  121/60, 120/56           HR:  66-93    Lab/Diagnostic data:   CBC RESULTS:   Recent Labs   Lab Test 09/14/17 01/10/17   WBC  6.4  4.8   RBC  5.19  4.76   HGB  14.9  13.6   HCT  47.9*  44.1   MCV  92  93   MCH  28.7  28.6   MCHC  31.1*  30.8*   RDW  14.7*  14.3   PLT  198  219       Last Basic Metabolic Panel:  Recent Labs   Lab Test 05/25/17 01/10/17 01/09/17   NA   --   139  141   POTASSIUM  4.2  4.0  4.5   CHLORIDE   --   107  106   OZ   --   8.4*  8.8   CO2   --   21*  26   BUN   --   23  24   CR  0.71  0.67  0.74   GLC  85  82  95       Liver Function Studies -   Recent Labs   Lab Test 05/11/17 07/06/13   1326   PROTTOTAL   --   6.6*   ALBUMIN   --   3.9   BILITOTAL   --   0.6   ALKPHOS   --   68   AST  14  31   ALT  14  33       TSH   Date Value Ref Range Status   01/10/2017 3.60 0.30 - 5.00 ulU/mL Final   07/01/2012 1.22 0.4 - 5.0 mU/L Final     ASSESSMENT/PLAN  (E03.9) Acquired hypothyroidism  (primary encounter diagnosis)  Managed on replacement. Continue synthroid as ordered. Check TSH yearly and PRN, and keep level b/w 4-5 in elderly.      (G30.1,  F02.81) Late onset Alzheimer's disease with behavioral disturbance  Chronic, progressive. Needs 24 hour skilled nursing care. HPI/ROS difficult to obtain with cognitive impairment. Expect further functional and cognitive decline. Expect weight loss. Continue 24 hour care. Monitor weight. Monitor functional status. Monitor for behavioral disturbances.    (F32.9) Major depressive disorder with single episode, remission status unspecified  Mood stable off medications. No changes at this time.     (Z51.5) Hospice care patient  Continue hospice with POC focused on comfort.     Orders:  The current medical regimen is effective; continue present plan and medications.    Electronically signed by:  Celsa Kraft, MICHAEL CNP

## 2018-04-12 PROBLEM — F02.80 LATE ONSET ALZHEIMER'S DISEASE WITHOUT BEHAVIORAL DISTURBANCE (H): Status: ACTIVE | Noted: 2018-01-01

## 2018-04-12 PROBLEM — G30.1 LATE ONSET ALZHEIMER'S DISEASE WITHOUT BEHAVIORAL DISTURBANCE (H): Status: ACTIVE | Noted: 2018-01-01

## 2018-04-12 PROBLEM — E03.9 HYPOTHYROIDISM, UNSPECIFIED TYPE: Status: ACTIVE | Noted: 2018-01-01

## 2018-04-13 NOTE — PROGRESS NOTES
Carlita Irizarry is a 89 year old female seen March 29, 2018 at [facility] where she has resided for one and a half years (admit 8/2016 to Board and Care side) seen to follow up advanced dementia.     Patient is seen on the unit, up to Broda chair  She smiles when addressed, but not able to answer questions.   No new concerns reported by nursing staff.     Patient has progressive dementia, and previous behaviors now mostly resolved.   Medications cut back due to daytime sleepiness, and she is no longer on CNS medications.         She is also treated for hypothyroidism    PMH:  Alzheimer's dementia, dx 2011  Anxiety / depression  Hypothyroidism  S/p open left elbow fx, 2012    SH:  , previously lived in a Senior Apartment.   Has one son Fredis, who is POA.     ROS:  Limited, but negative except for above.    Weight is stable.      EXAM:  NAD  /77  Pulse 74  Temp 97.8  F (36.6  C)  Resp 18  Wt 94 lb 3.2 oz (42.7 kg)  SpO2 94%  BMI 19.02 kg/m2   Neck supple without adenopathy  Lungs clear bilaterally with fair air movement  Heart RRR s1s2, without murmur  Abd soft, NT, no distention, +BS  Ext without edema; left hand contracture  Neuro: no focal findings, no tremor or stiffness, limited history  Psych: affect okay    Labs reviewed.        IMP/PLAN:  (E03.9) Hypothyroidism, unspecified type    Comment: on replacement  Plan: would no longer check TSH given goals of care.        (G30.1,  F02.81) Late onset Alzheimer's disease with behavioral disturbance  Comment: advanced, with loss of language, mobility and function.       Plan: LTC for assist with meds, meals, activity, safety.   Hospice care for comfort focus.        Kyung Fitch MD

## 2018-05-01 NOTE — LETTER
5/1/2018        RE: Carlita Irizarry  [address] [facility] [address]          [facility]    Chief Complaint   Patient presents with     [facility] Regulatory       [place] Medical Record Number:  [id number]    HPI:    Carlita Irizarry is a 89 year old  (1/2/1924), who is being seen today for a federally mandated E/M visit at [facility].  HPI information obtained from: facility chart records, facility staff and patient report.     Today's concerns are:  Late onset Alzheimer's disease without behavioral disturbance  Resides in memory care unit of AL where she receives medication management, meals, ADL assistance. Patient still speaks few words however has had significant loss of verbal skills. No recent behaviors noted by staff or hospice.     Hypothyroidism, unspecified type  TSH   Date Value Ref Range Status   01/10/2017 3.60 0.30 - 5.00 ulU/mL Final   Currently taking levothyroxine 75 mcg/day    Major depressive disorder with single episode, remission status unspecified  Mood appears at baseline. Unable to perform PHQ-9 assessment 2/2 severe cognitive impairment. Resident no longer requiring medication management.       ALLERGIES: No known allergies  PAST MEDICAL HISTORY:  has a past medical history of Alzheimer disease; High cholesterol; and Thyroid disease.  PAST SURGICAL HISTORY:  has a past surgical history that includes Irrigation and debridement upper extremity, combined (6/30/2012).  FAMILY HISTORY: family history is not on file.  SOCIAL HISTORY:  reports that she has quit smoking. She does not have any smokeless tobacco history on file. She reports that she drinks alcohol. She reports that she does not use illicit drugs.    MEDICATIONS:  Current Outpatient Prescriptions   Medication Sig Dispense Refill     Acetaminophen (TYLENOL PO) Take 650 mg by mouth every 4 hours as needed for mild pain or fever       bisacodyl (DULCOLAX) 10 MG Suppository Place 10 mg  rectally every 72 hours as needed for constipation       LEVOTHYROXINE SODIUM PO Take 75 mcg by mouth daily        Medications reviewed:  Medications reconciled to facility chart and changes were made to reflect current medications as identified as above med list. Below are the changes that were made:   Medications stopped since last EPIC medication reconciliation:   Medications Discontinued During This Encounter   Medication Reason     cholecalciferol 1000 UNITS TABS Discontinued by another Health Care Provider       Medications started since last [facility] medication reconciliation:  No orders of the defined types were placed in this encounter.    Case Management:  I have reviewed the care plan and MDS and do agree with the plan. Patient's desire to return to the community is not assessible due to cognitive impairment.  Information reviewed:  Medications, vital signs, orders, and nursing notes.    ROS:  Unobtainable secondary to cognitive impairment.     Exam:  Vitals: /62  Pulse 90  Temp 97.2  F (36.2  C)  Resp 18  Wt 102 lb 4.8 oz (46.4 kg)  SpO2 97%  BMI 20.65 kg/m2  BMI= Body mass index is 20.65 kg/(m^2).  GENERAL APPEARANCE:  Alert, in no distress  ENT:  Mouth and posterior oropharynx normal, moist mucous membranes, [place]  RESP:  respiratory effort and palpation of chest normal, lungs clear to auscultation   CV:  Palpation and auscultation of heart done , regular rate and rhythm, no murmur, rub, or gallop  M/S:   Gait and station normal  Digits and nails normal  SKIN:  Inspection of skin and subcutaneous tissue baseline, Palpation of skin and subcutaneous tissue baseline  NEURO:   Cranial nerves 2-12 are normal tested and grossly at patient's baseline     BP Reading:                                HR:    123/70  117/64  109/60    Lab/Diagnostic data:   CBC RESULTS:   Recent Labs   Lab Test 09/14/17 01/10/17   WBC  6.4  4.8   RBC  5.19  4.76   HGB  14.9  13.6   HCT  47.9*  44.1   MCV  92  93   MCH   28.7  28.6   MCHC  31.1*  30.8*   RDW  14.7*  14.3   PLT  198  219       Last Basic Metabolic Panel:  Recent Labs   Lab Test 05/25/17 01/10/17 01/09/17   NA   --   139  141   POTASSIUM  4.2  4.0  4.5   CHLORIDE   --   107  106   OZ   --   8.4*  8.8   CO2   --   21*  26   BUN   --   23  24   CR  0.71  0.67  0.74   GLC  85  82  95       Liver Function Studies -   Recent Labs   Lab Test 05/11/17 07/06/13   1326   PROTTOTAL   --   6.6*   ALBUMIN   --   3.9   BILITOTAL   --   0.6   ALKPHOS   --   68   AST  14  31   ALT  14  33       TSH   Date Value Ref Range Status   01/10/2017 3.60 0.30 - 5.00 ulU/mL Final   07/01/2012 1.22 0.4 - 5.0 mU/L Final         ASSESSMENT/PLAN  (G30.1,  F02.80) Late onset Alzheimer's disease without behavioral disturbance  (primary encounter diagnosis)  Chronic, progressive. Needs 24 hour skilled nursing care. HPI/ROS difficult to obtain with cognitive impairment. Expect further functional and cognitive decline. Expect weight loss. Continue 24 hour care. Monitor weight. Monitor functional status. Monitor for behavioral disturbances.    (E03.9) Hypothyroidism, unspecified type  Managed on supplement. No changes at this time. No further TSH/labs 2/2 goal of care focused on comfort. On Hospice services, admitted 11/5/17.     (F32.9) Major depressive disorder with single episode, remission status unspecified  Mood and behavior stable off medications. Continue to monitor and update NP with changes.    Orders:  The current medical regimen is effective; continue present plan and medications.      Electronically signed by:  MICHAEL Porras CNP      Sincerely,        Celsa Kraft, OSCAR

## 2018-05-01 NOTE — PROGRESS NOTES
[facility]    Chief Complaint   Patient presents with     [facility] Regulatory       [place] Medical Record Number:  [id number]    HPI:    Carlita Irizarry is a 89 year old  (1/2/1924), who is being seen today for a federally mandated E/M visit at [facility].  HPI information obtained from: facility chart records, facility staff and patient report.     Today's concerns are:  Late onset Alzheimer's disease without behavioral disturbance  Resides in memory care unit of AL where she receives medication management, meals, ADL assistance. Patient still speaks few words however has had significant loss of verbal skills. No recent behaviors noted by staff or hospice.     Hypothyroidism, unspecified type  TSH   Date Value Ref Range Status   01/10/2017 3.60 0.30 - 5.00 ulU/mL Final   Currently taking levothyroxine 75 mcg/day    Major depressive disorder with single episode, remission status unspecified  Mood appears at baseline. Unable to perform PHQ-9 assessment 2/2 severe cognitive impairment. Resident no longer requiring medication management.       ALLERGIES: No known allergies  PAST MEDICAL HISTORY:  has a past medical history of Alzheimer disease; High cholesterol; and Thyroid disease.  PAST SURGICAL HISTORY:  has a past surgical history that includes Irrigation and debridement upper extremity, combined (6/30/2012).  FAMILY HISTORY: family history is not on file.  SOCIAL HISTORY:  reports that she has quit smoking. She does not have any smokeless tobacco history on file. She reports that she drinks alcohol. She reports that she does not use illicit drugs.    MEDICATIONS:  Current Outpatient Prescriptions   Medication Sig Dispense Refill     Acetaminophen (TYLENOL PO) Take 650 mg by mouth every 4 hours as needed for mild pain or fever       bisacodyl (DULCOLAX) 10 MG Suppository Place 10 mg rectally every 72 hours as needed for constipation       LEVOTHYROXINE SODIUM PO Take 75  mcg by mouth daily        Medications reviewed:  Medications reconciled to facility chart and changes were made to reflect current medications as identified as above med list. Below are the changes that were made:   Medications stopped since last EPIC medication reconciliation:   Medications Discontinued During This Encounter   Medication Reason     cholecalciferol 1000 UNITS TABS Discontinued by another Health Care Provider       Medications started since last [facility] medication reconciliation:  No orders of the defined types were placed in this encounter.    Case Management:  I have reviewed the care plan and MDS and do agree with the plan. Patient's desire to return to the community is not assessible due to cognitive impairment.  Information reviewed:  Medications, vital signs, orders, and nursing notes.    ROS:  Unobtainable secondary to cognitive impairment.     Exam:  Vitals: /62  Pulse 90  Temp 97.2  F (36.2  C)  Resp 18  Wt 102 lb 4.8 oz (46.4 kg)  SpO2 97%  BMI 20.65 kg/m2  BMI= Body mass index is 20.65 kg/(m^2).  GENERAL APPEARANCE:  Alert, in no distress  ENT:  Mouth and posterior oropharynx normal, moist mucous membranes, [place]  RESP:  respiratory effort and palpation of chest normal, lungs clear to auscultation   CV:  Palpation and auscultation of heart done , regular rate and rhythm, no murmur, rub, or gallop  M/S:   Gait and station normal  Digits and nails normal  SKIN:  Inspection of skin and subcutaneous tissue baseline, Palpation of skin and subcutaneous tissue baseline  NEURO:   Cranial nerves 2-12 are normal tested and grossly at patient's baseline     BP Reading:                                HR:    123/70  117/64  109/60    Lab/Diagnostic data:   CBC RESULTS:   Recent Labs   Lab Test 09/14/17 01/10/17   WBC  6.4  4.8   RBC  5.19  4.76   HGB  14.9  13.6   HCT  47.9*  44.1   MCV  92  93   MCH  28.7  28.6   MCHC  31.1*  30.8*   RDW  14.7*  14.3   PLT  198  219       Last Basic  Metabolic Panel:  Recent Labs   Lab Test 05/25/17 01/10/17 01/09/17   NA   --   139  141   POTASSIUM  4.2  4.0  4.5   CHLORIDE   --   107  106   OZ   --   8.4*  8.8   CO2   --   21*  26   BUN   --   23  24   CR  0.71  0.67  0.74   GLC  85  82  95       Liver Function Studies -   Recent Labs   Lab Test 05/11/17 07/06/13   1326   PROTTOTAL   --   6.6*   ALBUMIN   --   3.9   BILITOTAL   --   0.6   ALKPHOS   --   68   AST  14  31   ALT  14  33       TSH   Date Value Ref Range Status   01/10/2017 3.60 0.30 - 5.00 ulU/mL Final   07/01/2012 1.22 0.4 - 5.0 mU/L Final         ASSESSMENT/PLAN  (G30.1,  F02.80) Late onset Alzheimer's disease without behavioral disturbance  (primary encounter diagnosis)  Chronic, progressive. Needs 24 hour skilled nursing care. HPI/ROS difficult to obtain with cognitive impairment. Expect further functional and cognitive decline. Expect weight loss. Continue 24 hour care. Monitor weight. Monitor functional status. Monitor for behavioral disturbances.    (E03.9) Hypothyroidism, unspecified type  Managed on supplement. No changes at this time. No further TSH/labs 2/2 goal of care focused on comfort. On Hospice services, admitted 11/5/17.     (F32.9) Major depressive disorder with single episode, remission status unspecified  Mood and behavior stable off medications. Continue to monitor and update NP with changes.    Orders:  The current medical regimen is effective; continue present plan and medications.      Electronically signed by:  MICHAEL Porras CNP